# Patient Record
Sex: MALE | Race: WHITE | Employment: FULL TIME | URBAN - METROPOLITAN AREA
[De-identification: names, ages, dates, MRNs, and addresses within clinical notes are randomized per-mention and may not be internally consistent; named-entity substitution may affect disease eponyms.]

---

## 2017-09-12 ENCOUNTER — OFFICE VISIT (OUTPATIENT)
Dept: ORTHOPEDIC SURGERY | Age: 62
End: 2017-09-12

## 2017-09-12 VITALS
SYSTOLIC BLOOD PRESSURE: 138 MMHG | WEIGHT: 200 LBS | HEIGHT: 72 IN | DIASTOLIC BLOOD PRESSURE: 89 MMHG | BODY MASS INDEX: 27.09 KG/M2

## 2017-09-12 DIAGNOSIS — M54.50 PAIN OF LUMBAR SPINE: ICD-10-CM

## 2017-09-12 DIAGNOSIS — M54.16 LUMBAR RADICULOPATHY: ICD-10-CM

## 2017-09-12 DIAGNOSIS — M51.26 LUMBAR DISC HERNIATION: Primary | ICD-10-CM

## 2017-09-12 PROCEDURE — 99203 OFFICE O/P NEW LOW 30 MIN: CPT | Performed by: PHYSICAL MEDICINE & REHABILITATION

## 2017-09-12 PROCEDURE — 72100 X-RAY EXAM L-S SPINE 2/3 VWS: CPT | Performed by: PHYSICAL MEDICINE & REHABILITATION

## 2017-09-12 RX ORDER — ATORVASTATIN CALCIUM 80 MG/1
80 TABLET, FILM COATED ORAL DAILY
COMMUNITY

## 2017-09-12 RX ORDER — METHYLPREDNISOLONE 4 MG/1
TABLET ORAL
Qty: 1 KIT | Refills: 0 | Status: SHIPPED | OUTPATIENT
Start: 2017-09-12 | End: 2017-09-18

## 2017-09-12 RX ORDER — NAPROXEN SODIUM 220 MG
220 TABLET ORAL 2 TIMES DAILY WITH MEALS
COMMUNITY

## 2017-09-12 RX ORDER — CLOPIDOGREL BISULFATE 75 MG/1
75 TABLET ORAL DAILY
COMMUNITY

## 2017-09-13 ENCOUNTER — HOSPITAL ENCOUNTER (OUTPATIENT)
Dept: PHYSICAL THERAPY | Age: 62
Discharge: HOME | End: 2017-09-30
Admitting: PHYSICAL MEDICINE & REHABILITATION

## 2017-09-13 NOTE — PLAN OF CARE
body position  Changing and Maintaining Body Position Current Status ():  At least 1 percent but less than 20 percent impaired, limited or restricted  Changing and Maintaining Body Position Goal Status (): 0 percent impaired, limited or restricted    Pain Scale: 9/10  Easing factors: rest  Provocative factors: sitting prolonged periods of time     Type: []Constant   [x]Intermittent  []Radiating []Localized []other:     Numbness/Tingling: none    Occupation/School: [employer]     Living Status/Prior Level of Function: Independent with ADLs and IADLs,    OBJECTIVE:     ROM  Comments   Lumbar Flex 80 deg    Lumbar Ext 15 deg      ROM LEFT RIGHT Comments   Lumbar Side Bend symmetrical ''    Lumbar Rotation      Hip Flexion      Hip Abd      Hip ER      Hip IR      Hip Extension      Knee Ext      Knee Flex      Hamstring Flex      Piriformis                    Strength LEFT RIGHT Comments   Multifidus      Transverse Ab 3/5     Hip Flexors      Hip Abductors      Hip Extensors                     Myotomes Normal Abnormal Comments   Hip flexion (L1-L2) x     Knee extension (L2-L4) x     Dorsiflexion (L4-L5) x     Great Toe Ext (L5) x     Ankle Eversion (S1-S2) x     Ankle PF(S1-S2) x       Dermatomes Normal Abnormal Comments   inguinal area (L1)  x     anterior mid-thigh (L2) x     distal ant thigh/med knee (L3) x     medial lower leg and foot (L4) x     lateral lower leg and foot (L5) x     posterior calf (S1) x     medial calcaneus (S2) x       Neural dynamic tension testing Normal Abnormal Comments   Slump Test  - Degree of knee flexion:       SLR  x     0-30      30-70      Femoral nerve (L2-4)        Reflexes Normal Abnormal Comments   S1-2 Seated achilles x     S1-2 Prone knee bend x     L3-4 Patellar tendon x     C5-6 Biceps      C6 Brachioradialis      C7-8 Triceps      Clonus      Babinski      Briseno's        Joint mobility: L-spine   []Normal    [x]Hypo   []Hyper    Palpation: TTP over R PSIS, L paraspinals    Functional Mobility/Transfers: antalgic    Posture: flexed posture    Gait: (include devices/WB status) antalgic     Bandages/Dressings/Incisions: NA    Orthopedic Special Tests:    Normal Abnormal N/A Comments   Toe walk         Heel Walk       Fwd Bend-aberrant or innominate mvmt)       Trendelenburg       Kemps/Quadrant       Zekek       ALLY/Ketan       Hip scour       SLR x      Crossed SLR       Supine to sit       Hip thrust       SI distraction/compression       PA/Spring x      Prone Instability test       Prone knee bend       Sacral Spring/thrust                  [x] Patient history, allergies, meds reviewed. Medical chart reviewed. See intake form. Review Of Systems (ROS):  [x]Performed Review of systems (Integumentary, CardioPulmonary, Neurological) by intake and observation. Intake form has been scanned into medical record. Patient has been instructed to contact their primary care physician regarding ROS issues if not already being addressed at this time.       Co-morbidities/Complexities (which will affect course of rehabilitation):   []None           Arthritic conditions   []Rheumatoid arthritis (M05.9)  []Osteoarthritis (M19.91)   Cardiovascular conditions   [x]Hypertension (I10)  []Hyperlipidemia (E78.5)  []Angina pectoris (I20)  []Atherosclerosis (I70)   Musculoskeletal conditions   []Disc pathology   []Congenital spine pathologies   []Prior surgical intervention  []Osteoporosis (M81.8)  []Osteopenia (M85.8)   Endocrine conditions   []Hypothyroid (E03.9)  []Hyperthyroid Gastrointestinal conditions   []Constipation (E96.89)   Metabolic conditions   []Morbid obesity (E66.01)  []Diabetes type 1(E10.65) or 2 (E11.65)   []Neuropathy (G60.9)     Pulmonary conditions   []Asthma (J45)  []Coughing   []COPD (J44.9)   Psychological Disorders  []Anxiety (F41.9)  []Depression (F32.9)   []Other:   []Other:           Barriers to/and or personal factors that will affect rehab potential: bend   [x]Reduced ability to ambulate prolonged functional periods/distances/surfaces   [x]Reduced ability to ascend/descend stairs   []other:       Participation Restrictions   []Reduced participation in self care activities   [x]Reduced participation in home management activities   [x]Reduced participation in work activities   [x]Reduced participation in social activities. []Reduced participation in sport/recreational activities. Classification:   [x]Signs/symptoms consistent with Lumbar instability/stabilization subgroup. [x]Signs/symptoms consistent with Lumbar mobilization/manipulation subgroup, myotomes and dermatomes intact. Meets manipulation criteria. [x]Signs/symptoms consistent with Lumbar direction specific/centralization subgroup   [x]Signs/symptoms consistent with Lumbar traction subgroup     []Signs/symptoms consistent with lumbar facet dysfunction   []Signs/symptoms consistent with lumbar stenosis type dysfunction   [x]Signs/symptoms consistent with nerve root involvement including myotome & dermatome dysfunction   []Signs/symptoms consistent with post-surgical status including: decreased ROM, strength and function.    []signs/symptoms consistent with pathology which may benefit from Dry needling     []other:      Prognosis/Rehab Potential:      []Excellent   [x]Good    []Fair   []Poor    Tolerance of evaluation/treatment:    []Excellent   [x]Good    []Fair   []Poor     Physical Therapy Evaluation Complexity Justification  [] A history of present problem with:  [] no personal factors and/or comorbidities that impact the plan of care;  [x]1-2 personal factors and/or comorbidities that impact the plan of care  []3 personal factors and/or comorbidities that impact the plan of care  [] An examination of body systems using standardized tests and measures addressing any of the following: body structures and functions (impairments), activity limitations, and/or participation restrictions;:  [] a total of 1-2 or more elements   [x] a total of 3 or more elements   [] a total of 4 or more elements   [] A clinical presentation with:  [x] stable and/or uncomplicated characteristics   [] evolving clinical presentation with changing characteristics  [] unstable and unpredictable characteristics;   [] Clinical decision making of [x] low, [] moderate, [] high complexity using standardized patient assessment instrument and/or measurable assessment of functional outcome. [x] EVAL (LOW) 57986 (typically 20 minutes face-to-face)  [] EVAL (MOD) 83156 (typically 30 minutes face-to-face)  [] EVAL (HIGH) 81129 (typically 45 minutes face-to-face)  [] RE-EVAL     PLAN: Begin PT focusing on: proximal hip mobilizations, LB mobs, LB core activation, proximal hip activation, and HEP    Frequency/Duration:  2 days per week for 12 Weeks:  Interventions:  [x]  Therapeutic exercise including: strength training, ROM, for LE, Glutes and core   [x]  NMR activation and proprioception for glutes , LE and Core   [x]  Manual therapy as indicated for Hip complex, LE and spine to include: Dry Needling/IASTM, STM, PROM, Gr I-IV mobilizations, manipulation. [x]  Modalities as needed that may include: thermal agents, E-stim, Biofeedback, US, iontophoresis as indicated  [x]  Patient education on joint protection, postural re-education, activity modification, progression of HEP. HEP instruction: (see scanned forms)    GOALS:  Patient stated goal: Pt would like to return to pain free work activities. Therapist goals for Patient:   Short Term Goals: To be achieved in: 2 weeks  1. Independent in HEP and progression per patient tolerance, in order to prevent re-injury. 2. Patient will have a decrease in pain to facilitate improvement in movement, function, and ADLs as indicated by Functional Deficits. Long Term Goals: To be achieved in: 12 weeks  1.  Disability index score of 0% or less for the SARIAH to assist with reaching prior level of function. 2. Patient will demonstrate increased AROM to WNL, good LS mobility, good hip ROM to allow for proper joint functioning as indicated by patients Functional Deficits. 3. Patient will demonstrate an increase in Strength to good proximal hip and core activation to allow for proper functional mobility as indicated by patients Functional Deficits. 4. Patient will return to all functional activities without increased symptoms or restriction.    5. Pt will demonstrate the ability to perform 10 sit to stands in 30 seconds with 0/10 pain. (patient specific functional goal)       Electronically signed by:  Alverda Spatz, PT,DPT

## 2017-09-13 NOTE — FLOWSHEET NOTE
[facility]opaedics and Sports Rehabilitation, [facility]    Physical [facility] Daily Treatment Note  Date:  2017    Patient Name:  Shyla Soni    :  1955  MRN: [id number]  Restrictions/Precautions:    Medical/Treatment Diagnosis Information:  · Diagnosis: M54.16 (ICD-10-CM) - Lumbar radiculopathy  · Treatment Diagnosis: low back pain with radiculitis   Insurance/Certification information:  PT Insurance Information: CUSTOM DESIGN  Physician Information:  Referring Practitioner: Dr. Edwige Mensah of care signed (Y/N):     Date of Patient follow up with Physician:     G-Code (if applicable):      Date G-Code Applied:    PT G-Codes  Functional Assessment Tool Used: Mod Oswestry   Score: 8%  Functional Limitation: Changing and maintaining body position  Changing and Maintaining Body Position Current Status ():  At least 1 percent but less than 20 percent impaired, limited or restricted  Changing and Maintaining Body Position Goal Status (): 0 percent impaired, limited or restricted    Progress Note: [x]  Yes  []  No  Next due by: Visit #10       Latex Allergy:  [x]NO      []YES  Preferred Language for Healthcare:   [x]English       []other:    Visit # Insurance Allowable   1 CUSTOM     Pain level:  9/10     SUBJECTIVE:  See eval    OBJECTIVE: See eval  Observation:   Test measurements:      RESTRICTIONS/PRECAUTIONS: HTN    Exercises/Interventions:     Therapeutic Ex Sets/sec Reps Notes HEP   Prone props 10'' 10     TA 5'' 10     TA marches 2 20     HS S 90/90 10'' 5     LTR 2 10     bridges 3 10     SLR flex 3 10                                                                    Manual Intervention       Long axis traction hip, L-spine PA, SIJ PA, STM 8'                                         NMR re-education                                                                          Therapeutic Exercise and NMR EXR  [x] (87617) Provided verbal/tactile cueing for activities related to strengthening, flexibility, endurance, ROM for improvements in LE, proximal hip, and core control with self care, mobility, lifting, ambulation.  [] (67430) Provided verbal/tactile cueing for activities related to improving balance, coordination, kinesthetic sense, posture, motor skill, proprioception  to assist with LE, proximal hip, and core control in self care, mobility, lifting, ambulation and eccentric single leg control. NMR and Therapeutic Activities:    [x] (13110 or 40208) Provided verbal/tactile cueing for activities related to improving balance, coordination, kinesthetic sense, posture, motor skill, proprioception and motor activation to allow for proper function of core, proximal hip and LE with self care and ADLs  [] (35086) Gait Re-education- Provided training and instruction to the patient for proper LE, core and proximal hip recruitment and positioning and eccentric body weight control with ambulation re-education including up and down stairs     Home Exercise Program:    [x] (40574) Reviewed/Progressed HEP activities related to strengthening, flexibility, endurance, ROM of core, proximal hip and LE for functional self-care, mobility, lifting and ambulation/stair navigation   [] (55394)Reviewed/Progressed HEP activities related to improving balance, coordination, kinesthetic sense, posture, motor skill, proprioception of core, proximal hip and LE for self care, mobility, lifting, and ambulation/stair navigation      Manual Treatments:  PROM / STM / Oscillations-Mobs:  G-I, II, III, IV (PA's, Inf., Post.)  [x] (91422) Provided manual therapy to mobilize LE, proximal hip and/or LS spine soft tissue/joints for the purpose of modulating pain, promoting relaxation,  increasing ROM, reducing/eliminating soft tissue swelling/inflammation/restriction, improving soft tissue extensibility and allowing for proper ROM for normal function with self care, mobility, lifting and ambulation.      Modalities: other medical complications  [] Other:     Prognosis: [x] Good [] Fair  [] Poor    Patient Requires Follow-up: [x] Yes  [] No    PLAN: See eval  [] Continue per plan of care [] Alter current plan (see comments)  [x] Plan of care initiated [] Hold pending MD visit [] Discharge    Electronically signed by: Luly Adler PT,DPT

## 2017-09-19 ENCOUNTER — HOSPITAL ENCOUNTER (OUTPATIENT)
Dept: PHYSICAL THERAPY | Age: 62
Discharge: HOME | End: 2017-09-19
Admitting: PHYSICAL MEDICINE & REHABILITATION

## 2017-09-26 ENCOUNTER — HOSPITAL ENCOUNTER (OUTPATIENT)
Dept: PHYSICAL THERAPY | Age: 62
Discharge: HOME | End: 2017-09-26
Admitting: PHYSICAL MEDICINE & REHABILITATION

## 2017-10-03 ENCOUNTER — HOSPITAL ENCOUNTER (OUTPATIENT)
Dept: PHYSICAL THERAPY | Age: 62
Discharge: HOME | End: 2017-10-03
Admitting: PHYSICAL MEDICINE & REHABILITATION

## 2017-10-03 NOTE — FLOWSHEET NOTE
[facility]  Orthopaedics and Sports Rehabilitation, [facility]    Physical Therapy Daily Treatment Note  Date:  10/3/2017    Patient Name:  Kyung King   \"Henrique\" :  1955  MRN: [id number]  Restrictions/Precautions:    Medical/Treatment Diagnosis Information:  · Diagnosis: M54.16 (ICD-10-CM) - Lumbar radiculopathy  · Treatment Diagnosis: low back pain with radiculitis   Insurance/Certification information:  PT Insurance Information: CUSTOM DESIGN  Physician Information:  Referring Practitioner: Dr. Navarro Angle of care signed (Y/N):     Date of Patient follow up with Physician:     G-Code (if applicable):      Date G-Code Applied:         Progress Note: [x]  Yes  []  No  Next due by: Visit #10       Latex Allergy:  [x]NO      []YES  Preferred Language for Healthcare:   [x]English       []other:    Visit # Insurance Allowable   4 CUSTOM     Pain level:  0/10     SUBJECTIVE:  Patient reports that that his back is overall feeling better. He reports that he occasionally has pain. OBJECTIVE: See eval  Observation:   Test measurements:      RESTRICTIONS/PRECAUTIONS: HTN    Exercises/Interventions:     Therapeutic Ex Sets/sec Reps Notes HEP       TA marches 2 20     Sahrmann level 1A 2 10     HS S 90/90 10'' 5         Bridges 3 10     SLR flex, abd 3 10     Clam shells 2 10     Hip add isometrics 10\" 10     Quadruped alt UE and LE 2 10     Chops in 1/2 kneel 2 10     SB HS curls + bridge 2 10     SB ball overhead 2 10     Squats NV                    Manual Intervention       Long axis traction hip, L-spine PA, SIJ PA, STM 8'                                         NMR re-education                                                                          Therapeutic Exercise and NMR EXR  [x] (17370) Provided verbal/tactile cueing for activities related to strengthening, flexibility, endurance, ROM for improvements in LE, proximal hip, and core control with self care, mobility, lifting, ambulation.   [] (41117) Provided verbal/tactile cueing for activities related to improving balance, coordination, kinesthetic sense, posture, motor skill, proprioception  to assist with LE, proximal hip, and core control in self care, mobility, lifting, ambulation and eccentric single leg control. NMR and Therapeutic Activities:    [x] (93774 or 89711) Provided verbal/tactile cueing for activities related to improving balance, coordination, kinesthetic sense, posture, motor skill, proprioception and motor activation to allow for proper function of core, proximal hip and LE with self care and ADLs  [] (48511) Gait Re-education- Provided training and instruction to the patient for proper LE, core and proximal hip recruitment and positioning and eccentric body weight control with ambulation re-education including up and down stairs     Home Exercise Program:    [x] (64345) Reviewed/Progressed HEP activities related to strengthening, flexibility, endurance, ROM of core, proximal hip and LE for functional self-care, mobility, lifting and ambulation/stair navigation   [] (92764)Reviewed/Progressed HEP activities related to improving balance, coordination, kinesthetic sense, posture, motor skill, proprioception of core, proximal hip and LE for self care, mobility, lifting, and ambulation/stair navigation      Manual Treatments:  PROM / STM / Oscillations-Mobs:  G-I, II, III, IV (PA's, Inf., Post.)  [x] (44711) Provided manual therapy to mobilize LE, proximal hip and/or LS spine soft tissue/joints for the purpose of modulating pain, promoting relaxation,  increasing ROM, reducing/eliminating soft tissue swelling/inflammation/restriction, improving soft tissue extensibility and allowing for proper ROM for normal function with self care, mobility, lifting and ambulation.      Modalities:  declined    Charges:  Timed Code Treatment Minutes: 35   Total Treatment Minutes: 35      [] EVAL (LOW) 94093 (typically 20 minutes face-to-face)    [] limited by pain  [] Patient limited by other medical complications  [] Other:     Prognosis: [x] Good [] Fair  [] Poor    Patient Requires Follow-up: [x] Yes  [] No    PLAN: See eval  [x] Continue per plan of care [] Alter current plan (see comments)  [] Plan of care initiated [] Hold pending MD visit [] Discharge    Electronically signed by: Ratna Sloan PT,DPT

## 2017-10-10 ENCOUNTER — OFFICE VISIT (OUTPATIENT)
Dept: ORTHOPEDIC SURGERY | Age: 62
End: 2017-10-10

## 2017-10-10 VITALS
HEIGHT: 72 IN | WEIGHT: 200 LBS | DIASTOLIC BLOOD PRESSURE: 77 MMHG | SYSTOLIC BLOOD PRESSURE: 124 MMHG | BODY MASS INDEX: 27.09 KG/M2

## 2017-10-10 DIAGNOSIS — M54.16 LUMBAR RADICULOPATHY: ICD-10-CM

## 2017-10-10 DIAGNOSIS — M47.816 SPONDYLOSIS OF LUMBAR REGION WITHOUT MYELOPATHY OR RADICULOPATHY: Primary | ICD-10-CM

## 2017-10-10 PROCEDURE — 99212 OFFICE O/P EST SF 10 MIN: CPT | Performed by: PHYSICAL MEDICINE & REHABILITATION

## 2017-11-01 ENCOUNTER — HOSPITAL ENCOUNTER (OUTPATIENT)
Dept: OTHER | Age: 62
Discharge: HOME | End: 2017-11-30
Attending: PHYSICAL MEDICINE & REHABILITATION | Admitting: PHYSICAL MEDICINE & REHABILITATION

## 2021-12-10 ENCOUNTER — VIRTUAL VISIT (OUTPATIENT)
Dept: PRIMARY CARE CLINIC | Age: 66
End: 2021-12-10
Payer: MEDICARE

## 2021-12-10 DIAGNOSIS — R05.9 COUGH: ICD-10-CM

## 2021-12-10 DIAGNOSIS — J01.90 ACUTE BACTERIAL SINUSITIS: Primary | ICD-10-CM

## 2021-12-10 DIAGNOSIS — J06.9 UPPER RESPIRATORY TRACT INFECTION, UNSPECIFIED TYPE: ICD-10-CM

## 2021-12-10 DIAGNOSIS — B96.89 ACUTE BACTERIAL SINUSITIS: Primary | ICD-10-CM

## 2021-12-10 PROCEDURE — 1123F ACP DISCUSS/DSCN MKR DOCD: CPT | Performed by: NURSE PRACTITIONER

## 2021-12-10 PROCEDURE — 99203 OFFICE O/P NEW LOW 30 MIN: CPT | Performed by: NURSE PRACTITIONER

## 2021-12-10 PROCEDURE — 4040F PNEUMOC VAC/ADMIN/RCVD: CPT | Performed by: NURSE PRACTITIONER

## 2021-12-10 PROCEDURE — G8484 FLU IMMUNIZE NO ADMIN: HCPCS | Performed by: NURSE PRACTITIONER

## 2021-12-10 PROCEDURE — 4004F PT TOBACCO SCREEN RCVD TLK: CPT | Performed by: NURSE PRACTITIONER

## 2021-12-10 PROCEDURE — G8421 BMI NOT CALCULATED: HCPCS | Performed by: NURSE PRACTITIONER

## 2021-12-10 PROCEDURE — 3017F COLORECTAL CA SCREEN DOC REV: CPT | Performed by: NURSE PRACTITIONER

## 2021-12-10 PROCEDURE — G8427 DOCREV CUR MEDS BY ELIG CLIN: HCPCS | Performed by: NURSE PRACTITIONER

## 2021-12-10 RX ORDER — AMOXICILLIN AND CLAVULANATE POTASSIUM 875; 125 MG/1; MG/1
1 TABLET, FILM COATED ORAL 2 TIMES DAILY
Qty: 20 TABLET | Refills: 0 | Status: SHIPPED | OUTPATIENT
Start: 2021-12-10 | End: 2021-12-20

## 2021-12-10 RX ORDER — BENZONATATE 200 MG/1
200 CAPSULE ORAL 3 TIMES DAILY PRN
Qty: 30 CAPSULE | Refills: 0 | Status: SHIPPED | OUTPATIENT
Start: 2021-12-10 | End: 2021-12-20

## 2021-12-10 RX ORDER — EZETIMIBE 10 MG/1
10 TABLET ORAL DAILY
COMMUNITY
Start: 2021-03-08

## 2021-12-10 ASSESSMENT — ENCOUNTER SYMPTOMS
VOMITING: 0
COUGH: 1
DIARRHEA: 0
WHEEZING: 0
NAUSEA: 0
SORE THROAT: 1
SHORTNESS OF BREATH: 0
CHEST TIGHTNESS: 0
SINUS PRESSURE: 1

## 2021-12-10 NOTE — PROGRESS NOTES
Armida Brand (:  1955) is a 73 y.o. male,New patient, here for evaluation of the following chief complaint(s): Head Congestion (no fever, started last sat), Cough, and Chest Congestion         ASSESSMENT/PLAN:  1. Acute bacterial sinusitis  -     amoxicillin-clavulanate (AUGMENTIN) 875-125 MG per tablet; Take 1 tablet by mouth 2 times daily for 10 days, Disp-20 tablet, R-0Normal  2. Cough  -     benzonatate (TESSALON) 200 MG capsule; Take 1 capsule by mouth 3 times daily as needed for Cough, Disp-30 capsule, R-0Normal  3. Upper respiratory tract infection, unspecified type      Return if symptoms worsen or fail to improve. SUBJECTIVE/OBJECTIVE:  Patient with symptoms for greater than 7 days. He is having sinus pressure, cough, chest congestion, sore throat that comes and goes  He has had 2 covid vaccines. He has not had the flu vaccine. He is not wheezing and not a smoker. Review of Systems   Constitutional: Negative for activity change, appetite change, chills, diaphoresis, fatigue and fever. HENT: Positive for congestion, sinus pressure and sore throat. Respiratory: Positive for cough. Negative for chest tightness, shortness of breath and wheezing. Gastrointestinal: Negative for diarrhea, nausea and vomiting. Neurological: Negative for headaches. No flowsheet data found. Physical Exam  Constitutional:       General: He is not in acute distress. Appearance: Normal appearance. He is normal weight. He is ill-appearing. HENT:      Head: Normocephalic. Right Ear: External ear normal.      Left Ear: External ear normal.      Nose: Nose normal.      Mouth/Throat:      Mouth: Mucous membranes are moist.   Eyes:      Extraocular Movements: Extraocular movements intact. Conjunctiva/sclera: Conjunctivae normal.      Pupils: Pupils are equal, round, and reactive to light.    Pulmonary:      Effort: Pulmonary effort is normal.   Abdominal:      General: Abdomen is flat.   Musculoskeletal:         General: Normal range of motion. Cervical back: Normal range of motion. Neurological:      General: No focal deficit present. Mental Status: He is alert and oriented to person, place, and time. Ethan Snider was evaluated through a synchronous (real-time) audio-video encounter. The patient (or guardian if applicable) is aware that this is a billable service. Verbal consent to proceed has been obtained within the past 12 months. The visit was conducted pursuant to the emergency declaration under the [address] [place] [address], [address] [place] [address] authority and the [employer] General Act. Patient identification was verified, and a caregiver was present when appropriate. The patient was located in a state where the provider was credentialed to provide care. An electronic signature was used to authenticate this note.     --NICHELLE Hurley - CNP

## 2022-01-31 ENCOUNTER — OFFICE VISIT (OUTPATIENT)
Dept: PRIMARY CARE CLINIC | Age: 67
End: 2022-01-31
Payer: MEDICARE

## 2022-01-31 DIAGNOSIS — J98.8 CONGESTION OF RESPIRATORY TRACT: Primary | ICD-10-CM

## 2022-01-31 DIAGNOSIS — R05.9 COUGH: ICD-10-CM

## 2022-01-31 DIAGNOSIS — J02.9 PHARYNGITIS, UNSPECIFIED ETIOLOGY: ICD-10-CM

## 2022-01-31 PROCEDURE — G8428 CUR MEDS NOT DOCUMENT: HCPCS | Performed by: NURSE PRACTITIONER

## 2022-01-31 PROCEDURE — 99213 OFFICE O/P EST LOW 20 MIN: CPT | Performed by: NURSE PRACTITIONER

## 2022-01-31 PROCEDURE — 4004F PT TOBACCO SCREEN RCVD TLK: CPT | Performed by: NURSE PRACTITIONER

## 2022-01-31 PROCEDURE — 1123F ACP DISCUSS/DSCN MKR DOCD: CPT | Performed by: NURSE PRACTITIONER

## 2022-01-31 PROCEDURE — G8421 BMI NOT CALCULATED: HCPCS | Performed by: NURSE PRACTITIONER

## 2022-01-31 PROCEDURE — 3017F COLORECTAL CA SCREEN DOC REV: CPT | Performed by: NURSE PRACTITIONER

## 2022-01-31 PROCEDURE — 4040F PNEUMOC VAC/ADMIN/RCVD: CPT | Performed by: NURSE PRACTITIONER

## 2022-01-31 PROCEDURE — G8484 FLU IMMUNIZE NO ADMIN: HCPCS | Performed by: NURSE PRACTITIONER

## 2022-01-31 RX ORDER — METHYLPREDNISOLONE 4 MG/1
TABLET ORAL
Qty: 1 KIT | Refills: 0 | Status: SHIPPED | OUTPATIENT
Start: 2022-01-31

## 2022-01-31 RX ORDER — BENZONATATE 200 MG/1
200 CAPSULE ORAL 3 TIMES DAILY PRN
Qty: 30 CAPSULE | Refills: 0 | Status: SHIPPED | OUTPATIENT
Start: 2022-01-31 | End: 2022-02-10

## 2022-01-31 ASSESSMENT — ENCOUNTER SYMPTOMS
HEMOPTYSIS: 0
WHEEZING: 0
RHINORRHEA: 0
DIARRHEA: 0
SORE THROAT: 1
COUGH: 1
HEARTBURN: 0
VOMITING: 0
CHEST TIGHTNESS: 0
NAUSEA: 0
SHORTNESS OF BREATH: 0

## 2022-01-31 NOTE — PROGRESS NOTES
Kody Vaughn (:  1955) is a 73 y.o. male,Established patient, here for evaluation of the following chief complaint(s):  Illness         ASSESSMENT/PLAN:  1. Congestion of respiratory tract  -     COVID-19  -     methylPREDNISolone (MEDROL DOSEPACK) 4 MG tablet; Take by mouth., Disp-1 kit, R-0Normal  -     benzonatate (TESSALON) 200 MG capsule; Take 1 capsule by mouth 3 times daily as needed for Cough, Disp-30 capsule, R-0Normal  2. Pharyngitis, unspecified etiology  -     COVID-19  -     methylPREDNISolone (MEDROL DOSEPACK) 4 MG tablet; Take by mouth., Disp-1 kit, R-0Normal  3. Cough  -     COVID-19  -     methylPREDNISolone (MEDROL DOSEPACK) 4 MG tablet; Take by mouth., Disp-1 kit, R-0Normal  -     benzonatate (TESSALON) 200 MG capsule; Take 1 capsule by mouth 3 times daily as needed for Cough, Disp-30 capsule, R-0Normal      Return if symptoms worsen or fail to improve. Subjective   SUBJECTIVE/OBJECTIVE:  Wife tested today for covid, she works in the school setting  He has a cough, congestion and sore throat  He had a URI a couple months ago, feels the same  He stated he has had covid vaccines    Cough  The current episode started yesterday. The problem has been unchanged. The problem occurs every few minutes. The cough is non-productive. Associated symptoms include nasal congestion and a sore throat. Pertinent negatives include no chest pain, chills, ear congestion, ear pain, fever, headaches, heartburn, hemoptysis, myalgias, postnasal drip, rash, rhinorrhea, shortness of breath, sweats, weight loss or wheezing. Review of Systems   Constitutional: Negative for activity change, appetite change, chills, diaphoresis, fatigue, fever and weight loss. HENT: Positive for congestion and sore throat. Negative for ear pain, postnasal drip and rhinorrhea. Respiratory: Positive for cough. Negative for hemoptysis, chest tightness, shortness of breath and wheezing.     Cardiovascular: Negative for chest pain. Gastrointestinal: Negative for diarrhea, heartburn, nausea and vomiting. Musculoskeletal: Negative for myalgias. Skin: Negative for rash. Neurological: Negative for headaches. Objective   Physical Exam  HENT:      Head: Normocephalic. Right Ear: External ear normal.      Left Ear: External ear normal.      Nose: Congestion present. Mouth/Throat:      Mouth: Mucous membranes are moist.   Eyes:      Extraocular Movements: Extraocular movements intact. Conjunctiva/sclera: Conjunctivae normal.      Pupils: Pupils are equal, round, and reactive to light. Pulmonary:      Effort: Pulmonary effort is normal.   Musculoskeletal:         General: Normal range of motion. Cervical back: Normal range of motion. Skin:     General: Skin is warm. Neurological:      General: No focal deficit present. Mental Status: He is alert and oriented to person, place, and time. An electronic signature was used to authenticate this note.     --NICHELLE Ellsworth - CNP

## 2022-02-01 LAB — SARS-COV-2: NOT DETECTED

## 2022-04-25 ENCOUNTER — HOSPITAL ENCOUNTER (OUTPATIENT)
Dept: PHYSICAL THERAPY | Age: 67
Setting detail: THERAPIES SERIES
Discharge: HOME | End: 2022-04-25
Payer: COMMERCIAL

## 2022-04-25 PROCEDURE — 97161 PT EVAL LOW COMPLEX 20 MIN: CPT

## 2022-04-25 PROCEDURE — 97110 THERAPEUTIC EXERCISES: CPT

## 2022-04-25 PROCEDURE — 97140 MANUAL THERAPY 1/> REGIONS: CPT

## 2022-04-25 NOTE — PLAN OF CARE
[facility] [facility], [facility]5 [facility], [address] [facility] [address]  Phone: [phone]   Fax:     [phone]       Physical Therapy Certification    Dear: Angel Bourne,    We had the pleasure of evaluating the following patient for physical therapy services at [address] [place] [address]. A summary of our findings can be found in the initial assessment below. This includes our plan of care. If you have any questions or concerns regarding these findings, please do not hesitate to contact me at the office phone number checked above. Thank you for the referral.       Physician Signature:_______________________________Date:__________________  By signing above (or electronic signature), therapists plan is approved by physician      Patient: Jovanny Cheung   : 1955   MRN: [id number]  Referring Physician: : Angel Bourne      Evaluation Date: 2022      Medical Diagnosis Information:  Diagnosis: Left hip fracture Z87.81   Treatment Diagnosis: Left hip pain, decreased ROM, functional mobility                                        Insurance information: PT Insurance Information: Workers comp, 12 visit thru      Precautions/ Contra-indications: is 50% WB    Latex Allergy:  [x]NO      []YES    Preferred Language for Healthcare:   [x]English       []other:    SUBJECTIVE: 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 Had surgery on 22 to having ORIF pinning of left hip    Relevant Medical History: None    Pain Scale: Current: 0/10; Max 5/10; Best 0/10    Easing factors: Rest    Provocative factors: Too much movement     Type: []Constant   [x]Intermittent  []Radiating []Localized []other:     Numbness/Tingling: None    Occupation/School: Warehouse, a lot of walking and pulling.      Living Status/Prior Level of Function: Independent with ADLs and IADLs. C-SSRS Triggered by Intake questionnaire (Past 2 wk assessment):   [x] No, Questionnaire did not trigger screening.   [] Yes, Patient intake triggered further evaluation      [] C-SSRS Screening completed  [] PCP notified via Plan of Care  [] Emergency services notified     OBJECTIVE:     ROM RIGHT LEFT   Hip Flexion  95   Hip Abduction  30   Hip Extension     Hip Internal Rotation     Hip External Rotation          Knee Extension  0   Knee Flexion  65        Ankle Dorsiflexion     Ankle Plantarflexion     Ankle Inversion     Ankle Eversion          Popliteal angle     Rectus femoris          Strength  RIGHT LEFT   Hip Flexors  4-   Hip Abductors     Hip Extension     Hip External Rotation     Hip Internal Rotation          Knee Extension     Knee Flexion          Ankle Dorsiflexion     Ankle Plantarflexion     Ankle Inversion     Ankle Eversion            Reflexes/Sensation:    [x]Dermatomes/Myotomes intact    []Reflexes equal and normal bilaterally - NT   []Other:    Joint mobility:    []Normal    [x]Hypo   []Hyper    Palpation: no ttp    Functional Mobility/Transfers: Mod I    Posture: WFL    Bandages/Dressings/Incisions: NT    Gait: (include devices/WB status) Using FWW at 50% WB    Orthopedic Special Tests: None                       [x] Patient history, allergies, meds reviewed. Medical chart reviewed. See intake form. Review Of Systems (ROS):  [x]Performed Review of systems (Integumentary, CardioPulmonary, Neurological) by intake and observation. Intake form has been scanned into medical record. Patient has been instructed to contact their primary care physician regarding ROS issues if not already being addressed at this time.       Co-morbidities/Complexities (which will affect course of rehabilitation):   [x]None           Arthritic conditions   []Rheumatoid arthritis (M05.9)  []Osteoarthritis (M19.91)   Cardiovascular conditions   []Hypertension (I10)  []Hyperlipidemia (E78.5)  []Angina pectoris (I20)  []Atherosclerosis (I70)   Musculoskeletal conditions   []Disc pathology   []Congenital spine pathologies   []Prior surgical intervention  []Osteoporosis (M81.8)  []Osteopenia (M85.8)   Endocrine conditions   []Hypothyroid (E03.9)  []Hyperthyroid Gastrointestinal conditions   []Constipation (J04.48)   Metabolic conditions   []Morbid obesity (E66.01)  []Diabetes type 1(E10.65) or 2 (E11.65)   []Neuropathy (G60.9)     Pulmonary conditions   []Asthma (J45)  []Coughing   []COPD (J44.9)   Psychological Disorders  []Anxiety (F41.9)  []Depression (F32.9)   []Other:   []Other:          Barriers to/and or personal factors that will affect rehab potential:              []Age  []Sex              []Motivation/Lack of Motivation                        []Co-Morbidities              []Cognitive Function, education/learning barriers              []Environmental, home barriers              []profession/work barriers  []past PT/medical experience  []other:  Justification: None    Falls Risk Assessment (30 days):   [] Falls Risk assessed and no intervention required. [x] Falls Risk assessed and Patient requires intervention due to being higher risk   TUG score (>12s at risk):     [x] Falls education provided, including           Functional Questionnaire: FOTO 37        ASSESSMENT: Jw Jeronimo is a 73 y.o. male reporting to OP PT s/p left hip ORIF on 4/1/22 which occurred after fall at work. Pt is noted to have reduced hip and knee ROM. Moderate reduction in hip flexion strength. Still ambulating with FWW at 50% WB.          Functional Impairments:     [x]Noted lumbar/proximal hip/LE joint hypomobility   [x]Decreased LE functional ROM   [x]Decreased core/proximal hip strength and neuromuscular control   [x]Decreased LE functional strength   [x]Reduced balance/proprioceptive control   []other:      Functional Activity Limitations (from functional questionnaire and intake)   []Reduced ability to tolerate prolonged functional positions   [x]Reduced ability or difficulty with changes of positions or transfers between positions   []Reduced ability to maintain good posture and demonstrate good body mechanics with sitting, bending, and lifting   []Reduced ability to sleep   [] Reduced ability or tolerance with driving and/or computer work   []Reduced ability to perform lifting, carrying tasks   [x]Reduced ability to squat   []Reduced ability to forward bend   [x]Reduced ability to ambulate prolonged functional periods/distances/surfaces   [x]Reduced ability to ascend/descend stairs   []Reduced ability to run, hop, cut or jump   []other:    Participation Restrictions   [x]Reduced participation in self care activities   [x]Reduced participation in home management activities   [x]Reduced participation in work activities   [x]Reduced participation in social activities. []Reduced participation in sport/recreation activities. Classification :    [x]Signs/symptoms consistent with post-surgical status including decreased ROM, strength and function.    []Signs/symptoms consistent with joint sprain/strain   []Signs/symptoms consistent with patella-femoral syndrome   []Signs/symptoms consistent with knee OA/hip OA   []Signs/symptoms consistent with internal derangement of knee/Hip   []Signs/symptoms consistent with functional hip weakness/NMR control      []Signs/symptoms consistent with tendinitis/tendinosis    []signs/symptoms consistent with pathology which may benefit from Dry needling      []other:      Prognosis/Rehab Potential:      []Excellent   [x]Good    []Fair   []Poor    Tolerance of evaluation/treatment:    []Excellent   [x]Good    []Fair   []Poor    Physical Therapy Evaluation Complexity Justification  [x] A history of present problem with:  [x] no personal factors and/or comorbidities that impact the plan of care;  []1-2 personal factors and/or comorbidities that impact the plan of care  []3 personal factors and/or comorbidities that impact the plan of care  [x] An examination of body systems using standardized tests and measures addressing any of the following: body structures and functions (impairments), activity limitations, and/or participation restrictions;:  [x] a total of 1-2 or more elements   [] a total of 3 or more elements   [] a total of 4 or more elements   [x] A clinical presentation with:  [x] stable and/or uncomplicated characteristics   [] evolving clinical presentation with changing characteristics  [] unstable and unpredictable characteristics;   [x] Clinical decision making of [] low, [] moderate, [] high complexity using standardized patient assessment instrument and/or measurable assessment of functional outcome. [x] EVAL (LOW) 12182 (typically 20 minutes face-to-face)  [] EVAL (MOD) 33352 (typically 30 minutes face-to-face)  [] EVAL (HIGH) 44268 (typically 45 minutes face-to-face)  [] RE-EVAL     PLAN:   Frequency/Duration:  2 days per week for 8 Weeks:  Interventions:  [x]  Therapeutic exercise including: strength training, ROM, for Lower extremity and core   [x]  NMR activation and proprioception for LE, Glutes and Core   [x]  Manual therapy as indicated for LE, Hip and spine to include: Dry Needling/IASTM, STM, PROM, Gr I-IV mobilizations, manipulation. [x] Modalities as needed that may include: thermal agents, E-stim, Biofeedback, US, iontophoresis as indicated  [x] Patient education on joint protection, postural re-education, activity modification, progression of HEP. HEP instruction: [id number]      GOALS:  Patient stated goal: Improve mobility, return to work. Therapist goals for Patient:   Short Term Goals: To be achieved in: 2 weeks  1. Independent in HEP and progression per patient tolerance, in order to prevent re-injury. [] Progressing: [] Met: [] Not Met: [] Adjusted     2.  Patient will have a decrease in pain of NRPS to <2/10 facilitate improvement in movement, function, and ADLs as indicated by Functional Deficits. [] Progressing: [] Met: [] Not Met: [] Adjusted     Long Term Goals: To be achieved in: 8 weeks  1. Pt will improve FOTO to 60 or more, indicating improved functional capacity. [] Progressing: [] Met: [] Not Met: [] Adjusted      2. Patient will demonstrate increased AROM to hip flex/knee flexion to 110/115 to allow for proper joint functioning as indicated by patients Functional Deficits. [] Progressing: [] Met: [] Not Met: [] Adjusted     3. Patient will demonstrate an increase in Strength to hip flexion  to 5/5 allow for proper functional mobility as indicated by patients Functional Deficits. [] Progressing: [] Met: [] Not Met: [] Adjusted     4. Patient will return to functional activities with NRPS of 0/10. [] Progressing: [] Met: [] Not Met: [] Adjusted     5.  Pt will be able to ambulate stairs reciprocally (patient specific functional goal)    [] Progressing: [] Met: [] Not Met: [] Adjusted      Electronically signed by:  Brenton Hodgkin, PT

## 2022-04-25 NOTE — FLOWSHEET NOTE
[address] [place] [address] and Sports Rehabilitation[address], [address] [place] [address] [address]  Phone: [phone]   Fax:     [phone]      Physical Therapy Treatment Note/ Progress Report:           Date:  2022    Patient Name:  Jeanna Guardado    :  1955  MRN: [id number]  Restrictions/Precautions:    Medical/Treatment Diagnosis Information:  · Diagnosis: Left hip fracture Z87.81  · Treatment Diagnosis: Left hip pain, decreased ROm and strength  Insurance/Certification information:  PT Insurance Information: Workers comp, 12 visit thru   Physician Information[de-identified] Annamaria Martinez  Has the plan of care been signed (Y/N):        []  Yes  []  No     Date of Patient follow up with Physician:     Assessment Summary: Radha Juan is a 73 y.o. male reporting to OP PT s/p left hip ORIF on 22 which occurred after fall at work. Pt is noted to have reduced hip and knee ROM. Moderate reduction in hip flexion strength. Still ambulating with FWW at 50% WB.        Is this a Progress Report:     []  Yes  [x]  No        If Yes:  Date Range for reporting period:  Beginning   22  Ending 22    Progress report will be due (10 Rx or 30 days whichever is less):        Recertification will be due (POC Duration  / 90 days whichever is less): 22          Visit # Insurance Allowable Auth Required   In Person  12 thru  []  Yes     []  No    [facility]   []  Yes     []  No    Total 1             Functional Scale: FOTO 37    Date assessed:       Latex Allergy:  [x]NO      []YES  Preferred Language for Healthcare:   [x]English       []other:      Pain level:  0-5/10     SUBJECTIVE:  see eval    OBJECTIVE: see eval      RESTRICTIONS/PRECAUTIONS: 50% WB    Exercises/Interventions: HEP code: [id number]  Therapeutic Ex (45214) HEP 22     Warm-up       Rec bike              TABLE       Heel slides x x30     Adduction isometric x x30     HL CS x X30, BB     Pelvic tilts x x20     Bridge x x20     Wall slides x x20                   SEATED                                                 STANDING                                                                                                                  Manual (65854): PROm to hip flexion, abduction and knee flexion 8'    Therapeutic Exercise and NMR EXR  [x] (26398) Provided verbal/tactile cueing for activities related to strengthening, flexibility, endurance, ROM for improvements in LE, proximal hip, and core control with self care, mobility, lifting, ambulation. [x] (55417) Provided verbal/tactile cueing for activities related to improving balance, coordination, kinesthetic sense, posture, motor skill, proprioception to assist with LE, proximal hip, and core control in self-care, mobility, lifting, ambulation and eccentric single leg control.      NMR and Therapeutic Activities:    [x] (95679 or 49951) Provided verbal/tactile cueing for activities related to improving balance, coordination, kinesthetic sense, posture, motor skill, proprioception and motor activation to allow for proper function of core, proximal hip and LE with self-care and ADLs and functional mobility.   [] (75117) Gait Re-education- Provided training and instruction to the patient for proper LE, core and proximal hip recruitment and positioning and eccentric body weight control with ambulation re-education including up and down stairs     Home Exercise Program:    [x] (77948) Reviewed/Progressed HEP activities related to strengthening, flexibility, endurance, ROM of core, proximal hip and LE for functional self-care, mobility, lifting and ambulation/stair navigation   [] (98605) Reviewed/Progressed HEP activities related to improving balance, coordination, kinesthetic sense, posture, motor skill, proprioception of core, proximal hip and LE for self-care, mobility, lifting, and ambulation/stair navigation      Manual Treatments:  PROM / STM / Oscillations-Mobs:  G-I, II, III, IV (PA's, Inf., Post.)  [x] (33527) Provided manual therapy to mobilize LE, proximal hip and/or LS spine soft tissue/joints for the purpose of modulating pain, promoting relaxation, increasing ROM, reducing/eliminating soft tissue swelling/inflammation/restriction, improving soft tissue extensibility and allowing for proper ROM for normal function with self-care, mobility, lifting and ambulation. Modalities:     [] GAME READY (VASO)- for significant edema, swelling, pain control. Charges:  Timed Code Treatment Minutes: 25   Total Treatment Minutes:  45   BWC:  TE TIME:  NMR TIME:  MANUAL TIME:   TA  UNTIMED MINUTES:  Medicare Total:   17    8    20        [x] EVAL (LOW) 34699 (typically 20 minutes face-to-face)  [] EVAL (MOD) 39997 (typically 30 minutes face-to-face)  [] EVAL (HIGH) 03651 (typically 45 minutes face-to-face)  [] RE-EVAL     [x] RU(60876) 1     [] IONTO  [] NMR (42084) x     [] VASO  [x] Manual (60812) 1     [] Dry Needle:  [] TA x      [] Mech Traction (77010)  [] ES(attended) (47540)      [] ES (un) (70452):        GOALS:     Patient stated goal: Improve mobility, return to work.      Therapist goals for Patient:   Short Term Goals: To be achieved in: 2 weeks  1. Independent in HEP and progression per patient tolerance, in order to prevent re-injury. []? Progressing: []? Met: []? Not Met: []? Adjusted      2. Patient will have a decrease in pain of NRPS to <2/10 facilitate improvement in movement, function, and ADLs as indicated by Functional Deficits. []? Progressing: []? Met: []? Not Met: []? Adjusted      Long Term Goals: To be achieved in: 8 weeks  1. Pt will improve FOTO to 60 or more, indicating improved functional capacity. []? Progressing: []? Met: []? Not Met: []? Adjusted       2. Patient will demonstrate increased AROM to hip flex/knee flexion to 110/115 to allow for proper joint functioning as indicated by patients Functional Deficits. []? Progressing: []? Met: []? Not Met: []? Adjusted      3. Patient will demonstrate an increase in Strength to hip flexion  to 5/5 allow for proper functional mobility as indicated by patients Functional Deficits. []? Progressing: []? Met: []? Not Met: []? Adjusted      4. Patient will return to functional activities with NRPS of 0/10. []? Progressing: []? Met: []? Not Met: []? Adjusted      5. Pt will be able to ambulate stairs reciprocally (patient specific functional goal)    []? Progressing: []? Met: []? Not Met: []? Adjusted                ASSESSMENT:  See eval    Patient received education on their current pathology and how their condition effects them with their functional activities. Patient understood discussion and questions were answered. Patient understands their activity limitations and understands rational for treatment progression. Pt educated on plan of care and HEP, if worsening symptoms to d/c that exercise. Return to Play: (if applicable)   []  Stage 1: Intro to Strength   []  Stage 2: Return to Run and Strength   []  Stage 3: Return to Jump and Strength   []  Stage 4: Dynamic Strength and Agility   []  Stage 5: Sport Specific Training     []  Ready to Return to Play, Meets All Above Stages   []  Not Ready for Return to Sports   Comments:                               PLAN: See eval  [x] Continue per plan of care [] Alter current plan (see comments above)  [] Plan of care initiated [] Hold pending MD visit [] Discharge      Electronically signed by:  Delmar Otero, PT    Note: If patient does not return for scheduled/ recommended follow up visits, this note will serve as a discharge from care along with most recent update on progress.

## 2022-04-25 NOTE — PROGRESS NOTES
[facility] [facility], [address], [address] [place] [address] [address]  Phone: [phone]   Fax: [phone]    Date: 2022          Patient Name; :  Casimiro Gustafson; 1955   Dx: Diagnosis: Left hip fracture Z87.81      Physician[de-identified] Balaji Rutherford        Total PT Visits:      Measures Previous Current   Pain (0-10)     Disability %           Assessment:        Prognosis for POC: [] Good [] Fair  [] Poor      Patient requires continued skilled intervention: [] Yes  [] No        Plan & Recommendations:  [] Continue rehabilitation due to objective improvement and continued functional deficits with frequency and duration:   [] Progress toward  []GAP, []Work Conditioning, []Independent HEP   [] Discharge due to   [] All goals achieved, [] Maximized \"medical necessity\" [] No subjective or objective improvements      Electronically signed by:  Dio Monte, PT  Therapy Plan of Care Re-Certification  This patient has been re-evaluated for physical therapy services and for therapy to continue, Medicare, Medicaid and other insurances require periodic physician review of the treatment plan. Please review the above re-evaluation and verify that you agree with plan of care as established above by signing the attached document and return it to our office or note changes to established plan below  [] Follow treatment plan as above [] Discontinue physical therapy  [] Change plan to:                                 __________________________________________________    Physician Signature:____________________________________ Date:____________  By signing above, therapists plan is approved by physician    If you have any questions or concerns, please don't hesitate to call.   Thank you for your referral.

## 2022-05-03 ENCOUNTER — HOSPITAL ENCOUNTER (OUTPATIENT)
Dept: PHYSICAL THERAPY | Age: 67
Setting detail: THERAPIES SERIES
Discharge: HOME | End: 2022-05-03
Payer: COMMERCIAL

## 2022-05-03 PROCEDURE — 97112 NEUROMUSCULAR REEDUCATION: CPT

## 2022-05-03 PROCEDURE — 97110 THERAPEUTIC EXERCISES: CPT

## 2022-05-03 PROCEDURE — 97140 MANUAL THERAPY 1/> REGIONS: CPT

## 2022-05-03 NOTE — FLOWSHEET NOTE
[address] [facility], [address] [place] [address] [address]  Phone: [phone]   Fax:     [phone]      Physical Therapy Treatment Note/ Progress Report:           Date:  5/3/2022    Patient Name:  Jackelyn Barclay    :  1955  MRN: [id number]  Restrictions/Precautions:    Medical/Treatment Diagnosis Information:  · Diagnosis: Left hip fracture Z87.81  · Treatment Diagnosis: Left hip pain, decreased ROm and strength  Insurance/Certification information:  PT Insurance Information: Workers comp, 12 visit thru   Physician Information[de-identified] Candido Sotelo  Has the plan of care been signed (Y/N):        []  Yes  []  No     Date of Patient follow up with Physician:     Assessment Summary: [facility] is a 73 y.o. male reporting to OP PT s/p left hip ORIF on 22 which occurred after fall at work. Pt is noted to have reduced hip and knee ROM. Moderate reduction in hip flexion strength. Still ambulating with FWW at 50% WB. Is this a Progress Report:     []  Yes  [x]  No        If Yes:  Date Range for reporting period:  Beginning   22  Ending 22    Progress report will be due (10 Rx or 30 days whichever is less):        Recertification will be due (POC Duration  / 90 days whichever is less): 22          Visit # Insurance Allowable Auth Required   In Person  12 thru  []  Yes     []  No    [facility]   []  Yes     []  No    Total 2             Functional Scale: FOTO 37    Date assessed:       Latex Allergy:  [x]NO      []YES  Preferred Language for Healthcare:   [x]English       []other:      Pain level:  0/10     SUBJECTIVE:  Pt states feels pretty good. Feels knee is bending some better.        OBJECTIVE:       RESTRICTIONS/PRECAUTIONS: 50% WB    Exercises/Interventions: HEP code: [id number]  Therapeutic Ex (17229) HEP 4/25/22 5/3/22    Warm-up       Rec bike   8'           TABLE       Heel slides x x30     Adduction isometric x x30 x30    SL CS x X30, BB X30, GTB    Pelvic tilts x x20 x30    Bridge x x20 x30    Wall slides x x20     Prone hip extension   x20 B    Prone knee flexion   x20                                       STANDING       Wedge gastroc stretch   1'    Heel raises on 1/2 roll   x30    Toe raises on 1/2 roll   x30    Leg Press DL   X30, 80#    HS curl machine   X30, 35#    Knee extension machine   X30, 30#                                                                     Manual (74978): PROm to hip flexion, abduction, IR/ER and knee flexion, prone quad stretch 13'    Therapeutic Exercise and NMR EXR  [x] (14960) Provided verbal/tactile cueing for activities related to strengthening, flexibility, endurance, ROM for improvements in LE, proximal hip, and core control with self care, mobility, lifting, ambulation. [x] (09145) Provided verbal/tactile cueing for activities related to improving balance, coordination, kinesthetic sense, posture, motor skill, proprioception to assist with LE, proximal hip, and core control in self-care, mobility, lifting, ambulation and eccentric single leg control.      NMR and Therapeutic Activities:    [x] (22314 or 18300) Provided verbal/tactile cueing for activities related to improving balance, coordination, kinesthetic sense, posture, motor skill, proprioception and motor activation to allow for proper function of core, proximal hip and LE with self-care and ADLs and functional mobility.   [] (26299) Gait Re-education- Provided training and instruction to the patient for proper LE, core and proximal hip recruitment and positioning and eccentric body weight control with ambulation re-education including up and down stairs     Home Exercise Program:    [x] (89667) Reviewed/Progressed HEP activities related to strengthening, flexibility, endurance, ROM of core, proximal hip and LE for functional self-care, mobility, lifting and ambulation/stair navigation   [] (77521) Reviewed/Progressed HEP activities related to improving balance, coordination, kinesthetic sense, posture, motor skill, proprioception of core, proximal hip and LE for self-care, mobility, lifting, and ambulation/stair navigation      Manual Treatments:  PROM / STM / Oscillations-Mobs:  G-I, II, III, IV (PA's, Inf., Post.)  [x] (24158) Provided manual therapy to mobilize LE, proximal hip and/or LS spine soft tissue/joints for the purpose of modulating pain, promoting relaxation, increasing ROM, reducing/eliminating soft tissue swelling/inflammation/restriction, improving soft tissue extensibility and allowing for proper ROM for normal function with self-care, mobility, lifting and ambulation. Modalities:     [] GAME READY (VASO)- for significant edema, swelling, pain control. Charges:  Timed Code Treatment Minutes: 53   Total Treatment Minutes:  53   BWC:  TE TIME:  NMR TIME:  MANUAL TIME:   TA  UNTIMED MINUTES:  Medicare Total:   30  10  13            [] EVAL (LOW) 46947 (typically 20 minutes face-to-face)  [] EVAL (MOD) 56563 (typically 30 minutes face-to-face)  [] EVAL (HIGH) 99602 (typically 45 minutes face-to-face)  [] RE-EVAL     [x] ES(21016) 2     [] IONTO  [x] NMR (62101) x     [] VASO  [x] Manual (07630) 1     [] Dry Needle:  [] TA x      [] Mech Traction (03371)  [] ES(attended) (86017)      [] ES (un) (64892):        GOALS:     Patient stated goal: Improve mobility, return to work.      Therapist goals for Patient:   Short Term Goals: To be achieved in: 2 weeks  1. Independent in HEP and progression per patient tolerance, in order to prevent re-injury. []? Progressing: []? Met: []? Not Met: []? Adjusted      2. Patient will have a decrease in pain of NRPS to <2/10 facilitate improvement in movement, function, and ADLs as indicated by Functional Deficits. []? Progressing: []? Met: []? Not Met: []? Adjusted      Long Term Goals: To be achieved in: 8 weeks  1.  Pt will improve FOTO to 60 or more, indicating improved functional capacity. []? Progressing: []? Met: []? Not Met: []? Adjusted       2. Patient will demonstrate increased AROM to hip flex/knee flexion to 110/115 to allow for proper joint functioning as indicated by patients Functional Deficits. []? Progressing: []? Met: []? Not Met: []? Adjusted      3. Patient will demonstrate an increase in Strength to hip flexion  to 5/5 allow for proper functional mobility as indicated by patients Functional Deficits. []? Progressing: []? Met: []? Not Met: []? Adjusted      4. Patient will return to functional activities with NRPS of 0/10. []? Progressing: []? Met: []? Not Met: []? Adjusted      5. Pt will be able to ambulate stairs reciprocally (patient specific functional goal)    []? Progressing: []? Met: []? Not Met: []? Adjusted                ASSESSMENT:  Pt ventura session well. Knee remains stiff but ROm improved from previous visit. Unable to make full revolution with bike. Hip stiff into abduction. Appears compliant with HEP. Patient received education on their current pathology and how their condition effects them with their functional activities. Patient understood discussion and questions were answered. Patient understands their activity limitations and understands rational for treatment progression. Pt educated on plan of care and HEP, if worsening symptoms to d/c that exercise. PLAN: See eval  [x] Continue per plan of care [] Alter current plan (see comments above)  [] Plan of care initiated [] Hold pending MD visit [] Discharge      Electronically signed by:  Td Thompson, PT    Note: If patient does not return for scheduled/ recommended follow up visits, this note will serve as a discharge from care along with most recent update on progress.

## 2022-05-06 ENCOUNTER — HOSPITAL ENCOUNTER (OUTPATIENT)
Dept: PHYSICAL THERAPY | Age: 67
Setting detail: THERAPIES SERIES
Discharge: HOME | End: 2022-05-06
Payer: COMMERCIAL

## 2022-05-06 PROCEDURE — 97110 THERAPEUTIC EXERCISES: CPT

## 2022-05-06 PROCEDURE — 97112 NEUROMUSCULAR REEDUCATION: CPT

## 2022-05-06 PROCEDURE — 97140 MANUAL THERAPY 1/> REGIONS: CPT

## 2022-05-06 NOTE — FLOWSHEET NOTE
3 [facility], [address] [place] [address] [address]  Phone: [phone]   Fax:     [phone]      Physical Therapy Treatment Note/ Progress Report:           Date:  2022    Patient Name:  Rachel Hennessy    :  1955  MRN: [id number]  Restrictions/Precautions:    Medical/Treatment Diagnosis Information:  · Diagnosis: Left hip fracture Z87.81  · Treatment Diagnosis: Left hip pain, decreased ROm and strength  Insurance/Certification information:  PT Insurance Information: Workers comp, 12 visit thru   Physician Information[de-identified] Ajith Delgado  Has the plan of care been signed (Y/N):        []  Yes  []  No     Date of Patient follow up with Physician:     Assessment Summary: Lona Martin is a 73 y.o. male reporting to OP PT s/p left hip ORIF on 22 which occurred after fall at work. Pt is noted to have reduced hip and knee ROM. Moderate reduction in hip flexion strength. Still ambulating with FWW at 50% WB. Is this a Progress Report:     []  Yes  [x]  No        If Yes:  Date Range for reporting period:  Beginning   22  Ending 22    Progress report will be due (10 Rx or 30 days whichever is less):        Recertification will be due (POC Duration  / 90 days whichever is less): 22          Visit # Insurance Allowable Auth Required   In Person  12 thru  []  Yes     []  No    [facility]   []  Yes     []  No    Total 3             Functional Scale: FOTO 37    Date assessed:       Latex Allergy:  [x]NO      []YES  Preferred Language for Healthcare:   [x]English       []other:      Pain level:  0/10     SUBJECTIVE:  Pt states everything feels good.        OBJECTIVE:       RESTRICTIONS/PRECAUTIONS: 50% WB    Exercises/Interventions: HEP code: [id number]  Therapeutic Ex () HEP 4/25/22 5/3/22 5/6/22   Warm-up       Rec bike   8' 8'          TABLE       Heel slides x x30     Adduction isometric x x30 x30 x30 SL CS x X30, BB X30, GTB --   SL Reverse CS       Pelvic tilts x x20 x30    Bridge x x20 x30    Wall slides x x20     Prone hip extension   x20 B x20 B   Prone knee flexion   x20    Prone ER    x20                               STANDING       Wedge gastroc stretch   1' 1'   Heel raises on 1/2 roll   x30 x30   Toe raises on 1/2 roll   x30 x30   Leg Press DL   X30, 80# X30, 80#   HS curl machine   X30, 35# X30, 45#   Knee extension machine   X30, 30# X30, 40#   Tandem standing    30\" ea                                                             Manual (45822): PROm to hip flexion, abduction, IR/ER and knee flexion, prone quad stretch 10'    Therapeutic Exercise and NMR EXR  [x] (53238) Provided verbal/tactile cueing for activities related to strengthening, flexibility, endurance, ROM for improvements in LE, proximal hip, and core control with self care, mobility, lifting, ambulation. [x] (69667) Provided verbal/tactile cueing for activities related to improving balance, coordination, kinesthetic sense, posture, motor skill, proprioception to assist with LE, proximal hip, and core control in self-care, mobility, lifting, ambulation and eccentric single leg control.      NMR and Therapeutic Activities:    [x] (61921 or 67515) Provided verbal/tactile cueing for activities related to improving balance, coordination, kinesthetic sense, posture, motor skill, proprioception and motor activation to allow for proper function of core, proximal hip and LE with self-care and ADLs and functional mobility.   [] (67477) Gait Re-education- Provided training and instruction to the patient for proper LE, core and proximal hip recruitment and positioning and eccentric body weight control with ambulation re-education including up and down stairs     Home Exercise Program:    [x] (60703) Reviewed/Progressed HEP activities related to strengthening, flexibility, endurance, ROM of core, proximal hip and LE for functional self-care, mobility, lifting and ambulation/stair navigation   [] (32019) Reviewed/Progressed HEP activities related to improving balance, coordination, kinesthetic sense, posture, motor skill, proprioception of core, proximal hip and LE for self-care, mobility, lifting, and ambulation/stair navigation      Manual Treatments:  PROM / STM / Oscillations-Mobs:  G-I, II, III, IV (PA's, Inf., Post.)  [x] (09335) Provided manual therapy to mobilize LE, proximal hip and/or LS spine soft tissue/joints for the purpose of modulating pain, promoting relaxation, increasing ROM, reducing/eliminating soft tissue swelling/inflammation/restriction, improving soft tissue extensibility and allowing for proper ROM for normal function with self-care, mobility, lifting and ambulation. Modalities:     [] GAME READY (VASO)- for significant edema, swelling, pain control. Charges:  Timed Code Treatment Minutes: 53   Total Treatment Minutes:  53   BWC:  TE TIME:  NMR TIME:  MANUAL TIME:   TA  UNTIMED MINUTES:  Medicare Total:   30  13  10            [] EVAL (LOW) 81444 (typically 20 minutes face-to-face)  [] EVAL (MOD) 13051 (typically 30 minutes face-to-face)  [] EVAL (HIGH) 58926 (typically 45 minutes face-to-face)  [] RE-EVAL     [x] WG(50527) 2     [] IONTO  [x] NMR (92125) x     [] VASO  [x] Manual (60604) 1     [] Dry Needle:  [] TA x      [] Mech Traction (15860)  [] ES(attended) (83710)      [] ES (un) (64642):        GOALS:     Patient stated goal: Improve mobility, return to work.      Therapist goals for Patient:   Short Term Goals: To be achieved in: 2 weeks  1. Independent in HEP and progression per patient tolerance, in order to prevent re-injury. []? Progressing: []? Met: []? Not Met: []? Adjusted      2. Patient will have a decrease in pain of NRPS to <2/10 facilitate improvement in movement, function, and ADLs as indicated by Functional Deficits. []? Progressing: []? Met: []? Not Met: []? Adjusted      Long Term Goals:  To be achieved in: 8 weeks  1. Pt will improve FOTO to 60 or more, indicating improved functional capacity. []? Progressing: []? Met: []? Not Met: []? Adjusted       2. Patient will demonstrate increased AROM to hip flex/knee flexion to 110/115 to allow for proper joint functioning as indicated by patients Functional Deficits. []? Progressing: []? Met: []? Not Met: []? Adjusted      3. Patient will demonstrate an increase in Strength to hip flexion  to 5/5 allow for proper functional mobility as indicated by patients Functional Deficits. []? Progressing: []? Met: []? Not Met: []? Adjusted      4. Patient will return to functional activities with NRPS of 0/10. []? Progressing: []? Met: []? Not Met: []? Adjusted      5. Pt will be able to ambulate stairs reciprocally (patient specific functional goal)    []? Progressing: []? Met: []? Not Met: []? Adjusted                ASSESSMENT:  Pt ventura session well. Able to make revolutions on bike today so knee ROM is improving. Patient received education on their current pathology and how their condition effects them with their functional activities. Patient understood discussion and questions were answered. Patient understands their activity limitations and understands rational for treatment progression. Pt educated on plan of care and HEP, if worsening symptoms to d/c that exercise. PLAN: See eval  [x] Continue per plan of care [] Alter current plan (see comments above)  [] Plan of care initiated [] Hold pending MD visit [] Discharge      Electronically signed by:  Zully Bright PT    Note: If patient does not return for scheduled/ recommended follow up visits, this note will serve as a discharge from care along with most recent update on progress.

## 2022-05-09 ENCOUNTER — APPOINTMENT (OUTPATIENT)
Dept: PHYSICAL THERAPY | Age: 67
End: 2022-05-09
Payer: COMMERCIAL

## 2022-05-09 ENCOUNTER — HOSPITAL ENCOUNTER (OUTPATIENT)
Dept: PHYSICAL THERAPY | Age: 67
Setting detail: THERAPIES SERIES
Discharge: HOME | End: 2022-05-09
Payer: COMMERCIAL

## 2022-05-09 PROCEDURE — 97112 NEUROMUSCULAR REEDUCATION: CPT

## 2022-05-09 PROCEDURE — 97110 THERAPEUTIC EXERCISES: CPT

## 2022-05-09 PROCEDURE — 97140 MANUAL THERAPY 1/> REGIONS: CPT

## 2022-05-09 NOTE — FLOWSHEET NOTE
[address] [place] [address] [facility], [address] [place] [address] [address]  Phone: [phone]   Fax:     [phone]      Physical Therapy Treatment Note/ Progress Report:           Date:  2022    Patient Name:  Ben Diaz    :  1955  MRN: [id number]  Restrictions/Precautions:    Medical/Treatment Diagnosis Information:  · Diagnosis: Left hip fracture Z87.81  · Treatment Diagnosis: Left hip pain, decreased ROm and strength  Insurance/Certification information:  PT Insurance Information: Workers comp, 12 visit thru   Physician Information[de-identified] David Umanzor  Has the plan of care been signed (Y/N):        []  Yes  []  No     Date of Patient follow up with Physician:     Assessment Summary: Asuncion Turner is a 73 y.o. male reporting to OP PT s/p left hip ORIF on 22 which occurred after fall at work. Pt is noted to have reduced hip and knee ROM. Moderate reduction in hip flexion strength. Still ambulating with FWW at 50% WB. Is this a Progress Report:     []  Yes  [x]  No        If Yes:  Date Range for reporting period:  Beginning   22  Ending 22    Progress report will be due (10 Rx or 30 days whichever is less): 8/10/18       Recertification will be due (POC Duration  / 90 days whichever is less): 22          Visit # Insurance Allowable Auth Required   In Person  12 thru  []  Yes     []  No    [facility]   []  Yes     []  No    Total 4             Functional Scale: FOTO 37    Date assessed:       Latex Allergy:  [x]NO      []YES  Preferred Language for Healthcare:   [x]English       []other:      Pain level:  0/10     SUBJECTIVE:  Pt states docotr said he can transition to full weight bearing.        OBJECTIVE:       RESTRICTIONS/PRECAUTIONS: 50% WB    Exercises/Interventions: HEP code: [id number]  Therapeutic Ex (52621) HEP 4/25/22 5/3/22 5/6/22 5/9/22   Warm-up        Rec bike   8' 8' 10'           TABLE        Heel slides x x30      Adduction isometric x x30 x30 x30 x30   SL CS x X30, BB X30, GTB --    SL Reverse CS        Pelvic tilts x x20 x30     Bridge x x20 x30  x30   Wall slides x x20      Prone hip extension   x20 B x20 B    Prone knee flexion   x20     Prone ER    x20                                    STANDING        Wedge gastroc stretch   1' 1' 1'   Heel raises on 1/2 roll   x30 x30 x30   Toe raises on 1/2 roll   x30 x30 x30   Leg Press DL   X30, 80# X30, 80# X30, 100#   Leg Press SL     X20, 60#   HS curl machine   X30, 35# X30, 45# X30, 50#   Knee extension machine   X30, 30# X30, 40# X30, 45#   Tandem standing    30\" ea    Anterior step ups     X20, 4\"   Lateral step ups     x15 B, 4\"   Gait training w/ SPC     5'                                             Manual (81780): PROm to hip flexion, abduction, IR/ER and knee flexion, prone quad stretch 10'    Therapeutic Exercise and NMR EXR  [x] (62163) Provided verbal/tactile cueing for activities related to strengthening, flexibility, endurance, ROM for improvements in LE, proximal hip, and core control with self care, mobility, lifting, ambulation. [x] (20426) Provided verbal/tactile cueing for activities related to improving balance, coordination, kinesthetic sense, posture, motor skill, proprioception to assist with LE, proximal hip, and core control in self-care, mobility, lifting, ambulation and eccentric single leg control.      NMR and Therapeutic Activities:    [x] (66709 or 95121) Provided verbal/tactile cueing for activities related to improving balance, coordination, kinesthetic sense, posture, motor skill, proprioception and motor activation to allow for proper function of core, proximal hip and LE with self-care and ADLs and functional mobility.   [] (96329) Gait Re-education- Provided training and instruction to the patient for proper LE, core and proximal hip recruitment and positioning and eccentric body weight control with ambulation re-education including up and down stairs     Home Exercise Program:    [x] (86483) Reviewed/Progressed HEP activities related to strengthening, flexibility, endurance, ROM of core, proximal hip and LE for functional self-care, mobility, lifting and ambulation/stair navigation   [] (25432) Reviewed/Progressed HEP activities related to improving balance, coordination, kinesthetic sense, posture, motor skill, proprioception of core, proximal hip and LE for self-care, mobility, lifting, and ambulation/stair navigation      Manual Treatments:  PROM / STM / Oscillations-Mobs:  G-I, II, III, IV (PA's, Inf., Post.)  [x] (40009) Provided manual therapy to mobilize LE, proximal hip and/or LS spine soft tissue/joints for the purpose of modulating pain, promoting relaxation, increasing ROM, reducing/eliminating soft tissue swelling/inflammation/restriction, improving soft tissue extensibility and allowing for proper ROM for normal function with self-care, mobility, lifting and ambulation. Modalities:     [] GAME READY (VASO)- for significant edema, swelling, pain control. Charges:  Timed Code Treatment Minutes: 53   Total Treatment Minutes:  53   BWC:  TE TIME:  NMR TIME:  MANUAL TIME:   TA  UNTIMED MINUTES:  Medicare Total:   30  13  10            [] EVAL (LOW) 50238 (typically 20 minutes face-to-face)  [] EVAL (MOD) 50292 (typically 30 minutes face-to-face)  [] EVAL (HIGH) 91473 (typically 45 minutes face-to-face)  [] RE-EVAL     [x] LO(10756) 2     [] IONTO  [x] NMR (89124) x     [] VASO  [x] Manual (08599) 1     [] Dry Needle:  [] TA x      [] [facility] Traction (28789)  [] ES(attended) (14457)      [] ES (un) (97545):        GOALS:     Patient stated goal: Improve mobility, return to work.      Therapist goals for Patient:   Short Term Goals: To be achieved in: 2 weeks  1. Independent in HEP and progression per patient tolerance, in order to prevent re-injury. []? Progressing: []? Met: []? Not Met: []? Adjusted      2.  Patient will have a decrease in pain of NRPS to <2/10 facilitate improvement in movement, function, and ADLs as indicated by Functional Deficits. []? Progressing: []? Met: []? Not Met: []? Adjusted      Long Term Goals: To be achieved in: 8 weeks  1. Pt will improve FOTO to 60 or more, indicating improved functional capacity. []? Progressing: []? Met: []? Not Met: []? Adjusted       2. Patient will demonstrate increased AROM to hip flex/knee flexion to 110/115 to allow for proper joint functioning as indicated by patients Functional Deficits. []? Progressing: []? Met: []? Not Met: []? Adjusted      3. Patient will demonstrate an increase in Strength to hip flexion  to 5/5 allow for proper functional mobility as indicated by patients Functional Deficits. []? Progressing: []? Met: []? Not Met: []? Adjusted      4. Patient will return to functional activities with NRPS of 0/10. []? Progressing: []? Met: []? Not Met: []? Adjusted      5. Pt will be able to ambulate stairs reciprocally (patient specific functional goal)    []? Progressing: []? Met: []? Not Met: []? Adjusted                ASSESSMENT:  Pt ventura session well. Worked on proper sequencing with SPC to increase safety. No ready yet for ambulating without AD. Patient received education on their current pathology and how their condition effects them with their functional activities. Patient understood discussion and questions were answered. Patient understands their activity limitations and understands rational for treatment progression. Pt educated on plan of care and HEP, if worsening symptoms to d/c that exercise.            PLAN: See eval  [x] Continue per plan of care [] Alter current plan (see comments above)  [] Plan of care initiated [] Hold pending MD visit [] Discharge      Electronically signed by:  Connie Keen PT    Note: If patient does not return for scheduled/ recommended follow up visits, this note will serve as a discharge from care along with most recent update on progress.

## 2022-05-13 ENCOUNTER — HOSPITAL ENCOUNTER (OUTPATIENT)
Dept: PHYSICAL THERAPY | Age: 67
Setting detail: THERAPIES SERIES
Discharge: HOME | End: 2022-05-13
Payer: COMMERCIAL

## 2022-05-13 PROCEDURE — 97110 THERAPEUTIC EXERCISES: CPT

## 2022-05-13 PROCEDURE — 97140 MANUAL THERAPY 1/> REGIONS: CPT

## 2022-05-13 PROCEDURE — 97112 NEUROMUSCULAR REEDUCATION: CPT

## 2022-05-13 NOTE — FLOWSHEET NOTE
[address] [place] [address] and Sports Rehabilitation[address], [address] [place] [address] [address]  Phone: [phone]   Fax:     [phone]      Physical Therapy Treatment Note/ Progress Report:           Date:  2022    Patient Name:  Ander Hale    :  1955  MRN: [id number]  Restrictions/Precautions:    Medical/Treatment Diagnosis Information:  · Diagnosis: Left hip fracture Z87.81  · Treatment Diagnosis: Left hip pain, decreased ROm and strength  Insurance/Certification information:  PT Insurance Information: Workers comp, 12 visit thru   Physician Information[de-identified] Isabel Hunt  Has the plan of care been signed (Y/N):        []  Yes  []  No     Date of Patient follow up with Physician:     Assessment Summary: Abhilash Padilla is a 73 y.o. male reporting to OP PT s/p left hip ORIF on 22 which occurred after fall at work. Pt is noted to have reduced hip and knee ROM. Moderate reduction in hip flexion strength. Still ambulating with FWW at 50% WB.        Is this a Progress Report:     []  Yes  [x]  No        If Yes:  Date Range for reporting period:  Beginning   22  Ending 22    Progress report will be due (10 Rx or 30 days whichever is less): 26       Recertification will be due (POC Duration  / 90 days whichever is less): 22          Visit # Insurance Allowable Auth Required   In Person  12 thru  []  Yes     []  No    [facility]   []  Yes     []  No    Total 5             Functional Scale: FOTO 37    Date assessed:       Latex Allergy:  [x]NO      []YES  Preferred Language for Healthcare:   [x]English       []other:      Pain level:  0/10     SUBJECTIVE:  Pt states       OBJECTIVE:       RESTRICTIONS/PRECAUTIONS: 50% WB    Exercises/Interventions: HEP code: [id number]  Therapeutic Ex (96563) HEP 5/3/22 5/6/22 5/9/22 5/13/22   Warm-up        Rec bike  8' 8' 10' 10'           TABLE        Heel slides x       Adduction isometric x x30 x30 x30 x30   SL CS x X30, GTB --     SL Reverse CS        Pelvic tilts x x30      Bridge x x30  x30 x30   Wall slides x       Prone hip extension  x20 B x20 B  x20 B   Prone knee flexion  x20      Prone ER   x20                                     STANDING        Wedge gastroc stretch  1' 1' 1' 1'   Heel raises on 1/2 roll  x30 x30 x30 x30   Toe raises on 1/2 roll  x30 x30 x30 x30   Leg Press DL  X30, 80# X30, 80# X30, 100# X30, 105#   Leg Press SL    X20, 60# X20, 80#   HS curl machine  X30, 35# X30, 45# X30, 50# X30, 55#   Knee extension machine  X30, 30# X30, 40# X30, 45# X30, 60#   Tandem standing   30\" ea     Anterior step ups    X20, 4\" X30, 4\"   Lateral step ups    x15 B, 4\" x15 B, 4\"   Anterior step down     X20, 4\"   BOSU balance     1'           Gait training w/ SPC    5'                                              Manual (82591): PROm to hip flexion, abduction, IR/ER and knee flexion, prone quad stretch 10'    Therapeutic Exercise and NMR EXR  [x] (21652) Provided verbal/tactile cueing for activities related to strengthening, flexibility, endurance, ROM for improvements in LE, proximal hip, and core control with self care, mobility, lifting, ambulation. [x] (77372) Provided verbal/tactile cueing for activities related to improving balance, coordination, kinesthetic sense, posture, motor skill, proprioception to assist with LE, proximal hip, and core control in self-care, mobility, lifting, ambulation and eccentric single leg control.      NMR and Therapeutic Activities:    [x] (29872 or 50375) Provided verbal/tactile cueing for activities related to improving balance, coordination, kinesthetic sense, posture, motor skill, proprioception and motor activation to allow for proper function of core, proximal hip and LE with self-care and ADLs and functional mobility.   [] (59082) Gait Re-education- Provided training and instruction to the patient for proper LE, core and proximal hip recruitment and positioning and eccentric body weight control with ambulation re-education including up and down stairs     Home Exercise Program:    [x] (94603) Reviewed/Progressed HEP activities related to strengthening, flexibility, endurance, ROM of core, proximal hip and LE for functional self-care, mobility, lifting and ambulation/stair navigation   [] (97387) Reviewed/Progressed HEP activities related to improving balance, coordination, kinesthetic sense, posture, motor skill, proprioception of core, proximal hip and LE for self-care, mobility, lifting, and ambulation/stair navigation      Manual Treatments:  PROM / STM / Oscillations-Mobs:  G-I, II, III, IV (PA's, Inf., Post.)  [x] (60518) Provided manual therapy to mobilize LE, proximal hip and/or LS spine soft tissue/joints for the purpose of modulating pain, promoting relaxation, increasing ROM, reducing/eliminating soft tissue swelling/inflammation/restriction, improving soft tissue extensibility and allowing for proper ROM for normal function with self-care, mobility, lifting and ambulation. Modalities:     [] GAME READY (VASO)- for significant edema, swelling, pain control. Charges:  Timed Code Treatment Minutes: 53   Total Treatment Minutes:  53   BWC:  TE TIME:  NMR TIME:  MANUAL TIME:   TA  UNTIMED MINUTES:  Medicare Total:   30  13  10            [] EVAL (LOW) 14282 (typically 20 minutes face-to-face)  [] EVAL (MOD) 44802 (typically 30 minutes face-to-face)  [] EVAL (HIGH) 03671 (typically 45 minutes face-to-face)  [] RE-EVAL     [x] BYRON(83046) 2     [] IONTO  [x] NMR (81223) x     [] VASO  [x] Manual (26282) 1     [] Dry Needle:  [] TA x      [] Mech Traction (38332)  [] ES(attended) (30695)      [] ES (un) (31238):        GOALS:     Patient stated goal: Improve mobility, return to work.      Therapist goals for Patient:   Short Term Goals: To be achieved in: 2 weeks  1. Independent in HEP and progression per patient tolerance, in order to prevent re-injury. []?  Progressing: []? Met: []? Not Met: []? Adjusted      2. Patient will have a decrease in pain of NRPS to <2/10 facilitate improvement in movement, function, and ADLs as indicated by Functional Deficits. []? Progressing: []? Met: []? Not Met: []? Adjusted      Long Term Goals: To be achieved in: 8 weeks  1. Pt will improve FOTO to 60 or more, indicating improved functional capacity. []? Progressing: []? Met: []? Not Met: []? Adjusted       2. Patient will demonstrate increased AROM to hip flex/knee flexion to 110/115 to allow for proper joint functioning as indicated by patients Functional Deficits. []? Progressing: []? Met: []? Not Met: []? Adjusted      3. Patient will demonstrate an increase in Strength to hip flexion  to 5/5 allow for proper functional mobility as indicated by patients Functional Deficits. []? Progressing: []? Met: []? Not Met: []? Adjusted      4. Patient will return to functional activities with NRPS of 0/10. []? Progressing: []? Met: []? Not Met: []? Adjusted      5. Pt will be able to ambulate stairs reciprocally (patient specific functional goal)    []? Progressing: []? Met: []? Not Met: []? Adjusted                ASSESSMENT:  Pt ventura session well. Functional mobility continues to improve. Patient received education on their current pathology and how their condition effects them with their functional activities. Patient understood discussion and questions were answered. Patient understands their activity limitations and understands rational for treatment progression. Pt educated on plan of care and HEP, if worsening symptoms to d/c that exercise.            PLAN: See eval  [x] Continue per plan of care [] Alter current plan (see comments above)  [] Plan of care initiated [] Hold pending MD visit [] Discharge      Electronically signed by:  Jocelyn Yeager PT    Note: If patient does not return for scheduled/ recommended follow up visits, this note will serve as a discharge from care along with most recent update on progress.

## 2022-05-17 ENCOUNTER — APPOINTMENT (OUTPATIENT)
Dept: PHYSICAL THERAPY | Age: 67
End: 2022-05-17
Payer: COMMERCIAL

## 2022-05-17 ENCOUNTER — HOSPITAL ENCOUNTER (OUTPATIENT)
Dept: PHYSICAL THERAPY | Age: 67
Setting detail: THERAPIES SERIES
Discharge: HOME | End: 2022-05-17
Payer: COMMERCIAL

## 2022-05-17 PROCEDURE — 97140 MANUAL THERAPY 1/> REGIONS: CPT

## 2022-05-17 PROCEDURE — 97110 THERAPEUTIC EXERCISES: CPT

## 2022-05-17 PROCEDURE — 97112 NEUROMUSCULAR REEDUCATION: CPT

## 2022-05-17 NOTE — FLOWSHEET NOTE
[address] [place] [address] and Sports Rehabilitation[address], [address] [place] [address] [address]  Phone: [phone]   Fax:     [phone]      Physical Therapy Treatment Note/ Progress Report:           Date:  2022    Patient Name:  Ander Hale    :  1955  MRN: [id number]  Restrictions/Precautions:    Medical/Treatment Diagnosis Information:  · Diagnosis: Left hip fracture Z87.81  · Treatment Diagnosis: Left hip pain, decreased ROm and strength  Insurance/Certification information:  PT Insurance Information: Workers comp, 12 visit thru   Physician Information[de-identified] Isabel Hunt  Has the plan of care been signed (Y/N):        []  Yes  []  No     Date of Patient follow up with Physician:     Assessment Summary: Abhilash Padilla is a 73 y.o. male reporting to OP PT s/p left hip ORIF on 22 which occurred after fall at work. Pt is noted to have reduced hip and knee ROM. Moderate reduction in hip flexion strength. Still ambulating with FWW at 50% WB. Is this a Progress Report:     []  Yes  [x]  No        If Yes:  Date Range for reporting period:  Beginning   22  Ending 22    Progress report will be due (10 Rx or 30 days whichever is less): 3/25/78       Recertification will be due (POC Duration  / 90 days whichever is less): 22          Visit # Insurance Allowable Auth Required   In Person  12 thru  []  Yes     []  No    Tele Health   []  Yes     []  No    Total 6             Functional Scale: FOTO 37, FOTO 60    Date assessed:       Latex Allergy:  [x]NO      []YES  Preferred Language for Healthcare:   [x]English       []other:      Pain level:  0/10     SUBJECTIVE:  Pt states continues to improve. Gets tired walking longer distances.        OBJECTIVE:     Knee flexion 120    Hip flexion 105      RESTRICTIONS/PRECAUTIONS: 50% WB    Exercises/Interventions: HEP code: [id number]  Therapeutic Ex (32220) HEP 22   Warm-up       Rec bike 10' 10' 10'          TABLE       Heel slides x      Adduction isometric x x30 x30 x30   SL CS x   x30   SL Reverse CS       Pelvic tilts x      Bridge x x30 x30 x30   Wall slides x      Prone hip extension   x20 B    Prone knee flexion       Prone ER                                   STANDING       Wedge gastroc stretch  1' 1' 1'   Heel raises on 1/2 roll  x30 x30 x30   Toe raises on 1/2 roll  x30 x30 x30   Leg Press DL  X30, 100# X30, 105# X20, 120#   Leg Press SL  X20, 60# X20, 80# x20 B, 80#   HS curl machine  X30, 50# X30, 55#    Knee extension machine  X30, 45# X30, 60#    Tandem standing    --   Anterior step ups  X20, 4\" X30, 4\" --   Lateral step ups  x15 B, 4\" x15 B, 4\" x15 B, 6\"   Anterior step down   X20, 4\" --   BOSU balance   1' 1'   BOSU marches    x30   Retrowalk CC    X10, 45#   Gait training w/ SPC  5'                                          Manual (91664): PROm to hip flexion, abduction, IR/ER and knee flexion, prone quad stretch 10'    Therapeutic Exercise and NMR EXR  [x] (01531) Provided verbal/tactile cueing for activities related to strengthening, flexibility, endurance, ROM for improvements in LE, proximal hip, and core control with self care, mobility, lifting, ambulation. [x] (81007) Provided verbal/tactile cueing for activities related to improving balance, coordination, kinesthetic sense, posture, motor skill, proprioception to assist with LE, proximal hip, and core control in self-care, mobility, lifting, ambulation and eccentric single leg control.      NMR and Therapeutic Activities:    [x] (77189 or 98458) Provided verbal/tactile cueing for activities related to improving balance, coordination, kinesthetic sense, posture, motor skill, proprioception and motor activation to allow for proper function of core, proximal hip and LE with self-care and ADLs and functional mobility.   [] (79773) Gait Re-education- Provided training and instruction to the patient for proper LE, core and proximal hip recruitment and positioning and eccentric body weight control with ambulation re-education including up and down stairs     Home Exercise Program:    [x] (41093) Reviewed/Progressed HEP activities related to strengthening, flexibility, endurance, ROM of core, proximal hip and LE for functional self-care, mobility, lifting and ambulation/stair navigation   [] (16671) Reviewed/Progressed HEP activities related to improving balance, coordination, kinesthetic sense, posture, motor skill, proprioception of core, proximal hip and LE for self-care, mobility, lifting, and ambulation/stair navigation      Manual Treatments:  PROM / STM / Oscillations-Mobs:  G-I, II, III, IV (PA's, Inf., Post.)  [x] (08562) Provided manual therapy to mobilize LE, proximal hip and/or LS spine soft tissue/joints for the purpose of modulating pain, promoting relaxation, increasing ROM, reducing/eliminating soft tissue swelling/inflammation/restriction, improving soft tissue extensibility and allowing for proper ROM for normal function with self-care, mobility, lifting and ambulation. Modalities:     [] GAME READY (VASO)- for significant edema, swelling, pain control. Charges:  Timed Code Treatment Minutes: 53   Total Treatment Minutes:  53   BWC:  TE TIME:  NMR TIME:  MANUAL TIME:   TA  UNTIMED MINUTES:  Medicare Total:   30  13  10            [] EVAL (LOW) 93796 (typically 20 minutes face-to-face)  [] EVAL (MOD) 32737 (typically 30 minutes face-to-face)  [] EVAL (HIGH) 18221 (typically 45 minutes face-to-face)  [] RE-EVAL     [x] TN(61796) 2     [] IONTO  [x] NMR (38810) x     [] VASO  [x] Manual (01271) 1     [] Dry Needle:  [] TA x      [] Mech Traction (87659)  [] ES(attended) (31551)      [] ES (un) (05443):        GOALS:     Patient stated goal: Improve mobility, return to work.      Therapist goals for Patient:   Short Term Goals: To be achieved in: 2 weeks  1.  Independent in HEP and progression per patient tolerance, in order to prevent re-injury. []? Progressing: []? Met: []? Not Met: []? Adjusted      2. Patient will have a decrease in pain of NRPS to <2/10 facilitate improvement in movement, function, and ADLs as indicated by Functional Deficits. []? Progressing: []? Met: []? Not Met: []? Adjusted      Long Term Goals: To be achieved in: 8 weeks  1. Pt will improve FOTO to 60 or more, indicating improved functional capacity. []? Progressing: [x]? Met: []? Not Met: []? Adjusted       2. Patient will demonstrate increased AROM to hip flex/knee flexion to 110/115 to allow for proper joint functioning as indicated by patients Functional Deficits. []? Progressing: []? Met: []? Not Met: []? Adjusted      3. Patient will demonstrate an increase in Strength to hip flexion  to 5/5 allow for proper functional mobility as indicated by patients Functional Deficits. []? Progressing: []? Met: []? Not Met: []? Adjusted      4. Patient will return to functional activities with NRPS of 0/10. []? Progressing: []? Met: []? Not Met: []? Adjusted      5. Pt will be able to ambulate stairs reciprocally (patient specific functional goal)    []? Progressing: []? Met: []? Not Met: []? Adjusted                ASSESSMENT:  Pt ventura session well. Functional mobility continues to improve. Significant improvement in FOTO score. Patient received education on their current pathology and how their condition effects them with their functional activities. Patient understood discussion and questions were answered. Patient understands their activity limitations and understands rational for treatment progression. Pt educated on plan of care and HEP, if worsening symptoms to d/c that exercise.            PLAN: See eval  [x] Continue per plan of care [] Alter current plan (see comments above)  [] Plan of care initiated [] Hold pending MD visit [] Discharge      Electronically signed by:  Sly Su PT    Note: If patient does not return for scheduled/ recommended follow up visits, this note will serve as a discharge from care along with most recent update on progress.

## 2022-05-20 ENCOUNTER — HOSPITAL ENCOUNTER (OUTPATIENT)
Dept: PHYSICAL THERAPY | Age: 67
Setting detail: THERAPIES SERIES
Discharge: HOME | End: 2022-05-20
Payer: COMMERCIAL

## 2022-05-20 PROCEDURE — 97110 THERAPEUTIC EXERCISES: CPT

## 2022-05-20 PROCEDURE — 97112 NEUROMUSCULAR REEDUCATION: CPT

## 2022-05-20 PROCEDURE — 97140 MANUAL THERAPY 1/> REGIONS: CPT

## 2022-05-20 NOTE — FLOWSHEET NOTE
[address] [place] [address] and Sports Rehabilitation[address], [address] [place] [address] [address]  Phone: [phone]   Fax:     [phone]      Physical Therapy Treatment Note/ Progress Report:           Date:  2022    Patient Name:  Sue Dejesus    :  1955  MRN: [id number]  Restrictions/Precautions:    Medical/Treatment Diagnosis Information:  · Diagnosis: Left hip fracture Z87.81  · Treatment Diagnosis: Left hip pain, decreased ROm and strength  Insurance/Certification information:  PT Insurance Information: Workers comp, 12 visit thru   Physician Information[de-identified] Silvio Juarez  Has the plan of care been signed (Y/N):        []  Yes  []  No     Date of Patient follow up with Physician:     Assessment Summary: Yancey Severs is a 73 y.o. male reporting to OP PT s/p left hip ORIF on 22 which occurred after fall at work. Pt is noted to have reduced hip and knee ROM. Moderate reduction in hip flexion strength. Still ambulating with FWW at 50% WB. Is this a Progress Report:     []  Yes  [x]  No        If Yes:  Date Range for reporting period:  Beginning   22  Ending 22    Progress report will be due (10 Rx or 30 days whichever is less):        Recertification will be due (POC Duration  / 90 days whichever is less): 22          Visit # Insurance Allowable Auth Required   In Person  12 thru  []  Yes     []  No    [facility]   []  Yes     []  No    Total 7             Functional Scale: FOTO 37, FOTO 60    Date assessed:       Latex Allergy:  [x]NO      []YES  Preferred Language for Healthcare:   [x]English       []other:      Pain level:  0/10     SUBJECTIVE:  Pt states hip feels good.        OBJECTIVE:     Knee flexion 120    Hip flexion 105      RESTRICTIONS/PRECAUTIONS: 50% WB    Exercises/Interventions: HEP code: [id number]  Therapeutic Ex (23094) HEP 22/   Warm-up        Rec bike  10' 10' 10' 10'           TABLE Adduction isometric x x30 x30 x30 x30   SL CS x   x30 X30, BTB   SL Reverse CS     x30   Bridge x x30 x30 x30 x30   Prone hip extension   x20 B                                     STANDING        Wedge gastroc stretch  1' 1' 1' 1'   Heel raises on 1/2 roll  x30 x30 x30 x30   Toe raises on 1/2 roll  x30 x30 x30 x30   Leg Press DL  X30, 100# X30, 105# X20, 120# X20, 125#   Leg Press SL  X20, 60# X20, 80# x20 B, 80# X20, 85#   HS curl machine  X30, 50# X30, 55#  X30, 60#   Knee extension machine  X30, 45# X30, 60#  X30, 60#   Tandem standing    -- --   Anterior step ups  X20, 4\" X30, 4\" -- X20, 6\"   Lateral step ups  x15 B, 4\" x15 B, 4\" x15 B, 6\" x15 B, 6\"   Anterior step down   X20, 4\" -- --   BOSU balance   1' 1' 1'   BOSU squats     x15   BOSU marches    x30    Retrowalk CC    X10, 45# X15, 45#   Gait training w/ SPC  5'                                                Manual (74554): PROm to hip flexion, abduction, IR/ER and knee flexion, prone quad stretch 10'    Therapeutic Exercise and NMR EXR  [x] (83745) Provided verbal/tactile cueing for activities related to strengthening, flexibility, endurance, ROM for improvements in LE, proximal hip, and core control with self care, mobility, lifting, ambulation. [x] (60885) Provided verbal/tactile cueing for activities related to improving balance, coordination, kinesthetic sense, posture, motor skill, proprioception to assist with LE, proximal hip, and core control in self-care, mobility, lifting, ambulation and eccentric single leg control.      NMR and Therapeutic Activities:    [x] (31431 or 99031) Provided verbal/tactile cueing for activities related to improving balance, coordination, kinesthetic sense, posture, motor skill, proprioception and motor activation to allow for proper function of core, proximal hip and LE with self-care and ADLs and functional mobility.   [] (08101) Gait Re-education- Provided training and instruction to the patient for proper LE, core and proximal hip recruitment and positioning and eccentric body weight control with ambulation re-education including up and down stairs     Home Exercise Program:    [x] (71073) Reviewed/Progressed HEP activities related to strengthening, flexibility, endurance, ROM of core, proximal hip and LE for functional self-care, mobility, lifting and ambulation/stair navigation   [] (31311) Reviewed/Progressed HEP activities related to improving balance, coordination, kinesthetic sense, posture, motor skill, proprioception of core, proximal hip and LE for self-care, mobility, lifting, and ambulation/stair navigation      Manual Treatments:  PROM / STM / Oscillations-Mobs:  G-I, II, III, IV (PA's, Inf., Post.)  [x] (01516) Provided manual therapy to mobilize LE, proximal hip and/or LS spine soft tissue/joints for the purpose of modulating pain, promoting relaxation, increasing ROM, reducing/eliminating soft tissue swelling/inflammation/restriction, improving soft tissue extensibility and allowing for proper ROM for normal function with self-care, mobility, lifting and ambulation. Modalities:     [] GAME READY (VASO)- for significant edema, swelling, pain control. Charges:  Timed Code Treatment Minutes: 53   Total Treatment Minutes:  53   BWC:  TE TIME:  NMR TIME:  MANUAL TIME:   TA  UNTIMED MINUTES:  Medicare Total:   30  13  10            [] EVAL (LOW) 66379 (typically 20 minutes face-to-face)  [] EVAL (MOD) 05122 (typically 30 minutes face-to-face)  [] EVAL (HIGH) 88665 (typically 45 minutes face-to-face)  [] RE-EVAL     [x] TM(31869) 2     [] IONTO  [x] NMR (18746) x     [] VASO  [x] Manual (63259) 1     [] Dry Needle:  [] TA x      [] Mech Traction (51822)  [] ES(attended) (47194)      [] ES (un) (66755):        GOALS:     Patient stated goal: Improve mobility, return to work.      Therapist goals for Patient:   Short Term Goals: To be achieved in: 2 weeks  1.  Independent in HEP and progression per patient tolerance, in order to prevent re-injury. []? Progressing: [x]? Met: []? Not Met: []? Adjusted      2. Patient will have a decrease in pain of NRPS to <2/10 facilitate improvement in movement, function, and ADLs as indicated by Functional Deficits. []? Progressing: [x]? Met: []? Not Met: []? Adjusted      Long Term Goals: To be achieved in: 8 weeks  1. Pt will improve FOTO to 60 or more, indicating improved functional capacity. []? Progressing: [x]? Met: []? Not Met: []? Adjusted       2. Patient will demonstrate increased AROM to hip flex/knee flexion to 110/115 to allow for proper joint functioning as indicated by patients Functional Deficits. [x]? Progressing: []? Met: []? Not Met: []? Adjusted      3. Patient will demonstrate an increase in Strength to hip flexion  to 5/5 allow for proper functional mobility as indicated by patients Functional Deficits. []? Progressing: []? Met: []? Not Met: []? Adjusted      4. Patient will return to functional activities with NRPS of 0/10. []? Progressing: []? Met: []? Not Met: []? Adjusted      5. Pt will be able to ambulate stairs reciprocally (patient specific functional goal)    []? Progressing: []? Met: []? Not Met: []? Adjusted                ASSESSMENT:  Hailey Bal has been seen in PT for 7 visits following left hip ORIF. Pt has responded very well overall to PT session which have been addressing ROM, progressive strengthening, balance and gait training. Flexion and IR ROM still limited. Pt will continue to benefit from PT addressing deficits in order to regain full function and improve QOL. Patient received education on their current pathology and how their condition effects them with their functional activities. Patient understood discussion and questions were answered. Patient understands their activity limitations and understands rational for treatment progression. Pt educated on plan of care and HEP, if worsening symptoms to d/c that exercise. PLAN: See eval  [x] Continue per plan of care [] Alter current plan (see comments above)  [] Plan of care initiated [] Hold pending MD visit [] Discharge      Electronically signed by:  Delmar Otero PT    Note: If patient does not return for scheduled/ recommended follow up visits, this note will serve as a discharge from care along with most recent update on progress.

## 2022-05-20 NOTE — PROGRESS NOTES
[address] [place] [address] and Sports Rehabilitation[address], [address] [place] [address] [address]  Phone: [phone]   Fax: [phone]    Date: 2022          Patient Name; :  Rachel Hennessy; 1955   Dx: Diagnosis: Left hip fracture Z87.81      Physician[de-identified] Ajith Delgado        Total PT Visits: 7     Measures Previous Current   Pain (0-10)     Disability % 37 60         Assessment:  Lona Martin has been seen in PT for 7 visits following left hip ORIF. Pt has responded very well overall to PT session which have been addressing ROM, progressive strengthening, balance and gait training. Flexion and IR ROM still limited. Pt will continue to benefit from PT addressing deficits in order to regain full function and improve QOL. Prognosis for POC: [x] Good [] Fair  [] Poor      Patient requires continued skilled intervention: [] Yes  [] No        Plan & Recommendations:  [x] Continue rehabilitation due to objective improvement and continued functional deficits with frequency and duration:   [] Progress toward  []GAP, []Work Conditioning, []Independent HEP   [] Discharge due to   [] All goals achieved, [] Maximized \"medical necessity\" [] No subjective or objective improvements      Electronically signed by:  Emory Davis PT  Therapy Plan of Care Re-Certification  This patient has been re-evaluated for physical therapy services and for therapy to continue, Medicare, Medicaid and other insurances require periodic physician review of the treatment plan.  Please review the above re-evaluation and verify that you agree with plan of care as established above by signing the attached document and return it to our office or note changes to established plan below  [] Follow treatment plan as above [] Discontinue physical therapy  [] Change plan to:                                 __________________________________________________    Physician Signature:____________________________________ Date:____________  By signing above, therapists plan is approved by physician    If you have any questions or concerns, please don't hesitate to call.   Thank you for your referral.

## 2022-05-23 ENCOUNTER — HOSPITAL ENCOUNTER (OUTPATIENT)
Dept: PHYSICAL THERAPY | Age: 67
Setting detail: THERAPIES SERIES
Discharge: HOME | End: 2022-05-23
Payer: COMMERCIAL

## 2022-05-23 PROCEDURE — 97110 THERAPEUTIC EXERCISES: CPT

## 2022-05-23 PROCEDURE — 97140 MANUAL THERAPY 1/> REGIONS: CPT

## 2022-05-23 PROCEDURE — 97112 NEUROMUSCULAR REEDUCATION: CPT

## 2022-05-23 NOTE — FLOWSHEET NOTE
[address] [place] [address] and Sports Rehabilitation[address], [address] [place] [address] [address]  Phone: [phone]   Fax:     [phone]      Physical Therapy Treatment Note/ Progress Report:           Date:  2022    Patient Name:  Dai Meeks    :  1955  MRN: [id number]  Restrictions/Precautions:    Medical/Treatment Diagnosis Information:  · Diagnosis: Left hip fracture Z87.81  · Treatment Diagnosis: Left hip pain, decreased ROm and strength  Insurance/Certification information:  PT Insurance Information: Workers comp, 12 visit thru   Physician Information[de-identified] Shanelle Arroyo  Has the plan of care been signed (Y/N):        []  Yes  []  No     Date of Patient follow up with Physician:     Assessment Summary: Juan Serrano is a 73 y.o. male reporting to OP PT s/p left hip ORIF on 22 which occurred after fall at work. Pt is noted to have reduced hip and knee ROM. Moderate reduction in hip flexion strength. Still ambulating with FWW at 50% WB. Is this a Progress Report:     []  Yes  [x]  No        If Yes:  Date Range for reporting period:  Beginning   22  Ending 22    Progress report will be due (10 Rx or 30 days whichever is less):        Recertification will be due (POC Duration  / 90 days whichever is less): 22          Visit # Insurance Allowable Auth Required   In Person  12 thru  []  Yes     []  No    [facility]   []  Yes     []  No    Total 8             Functional Scale: FOTO 37, FOTO 60    Date assessed:       Latex Allergy:  [x]NO      []YES  Preferred Language for Healthcare:   [x]English       []other:      Pain level:  0/10     SUBJECTIVE:  Pt states hip has good and bad days.        OBJECTIVE:     Knee flexion 120    Hip flexion 105      RESTRICTIONS/PRECAUTIONS: 50% WB    Exercises/Interventions: HEP code: [id number]  Therapeutic Ex (79970) HEP 22/ 22   Warm-up       Rec bike  10' 10' 10', Lv 5          TABLE Adduction isometric x x30 x30 x30   SL CS x x30 X30, BTB x20 B, BTB   SL Reverse CS   x30 x20 B   Bridge x x30 x30 x30   Prone hip extension    x20 B                               STANDING       Wedge gastroc stretch  1' 1' 1'   Heel raises on 1/2 roll  x30 x30 x30   Toe raises on 1/2 roll  x30 x30 x30   Leg Press DL  X20, 120# X20, 125# X20, 130#   Leg Press SL  x20 B, 80# X20, 85# X20 B, 85#   HS curl machine   X30, 60#    Knee extension machine   X30, 60#    Tandem standing  -- --    Anterior step ups  -- X20, 6\" X20, 6\"   Lateral step ups  x15 B, 6\" x15 B, 6\"    Anterior step down  -- --    Rotational step up    X20, 6\"   BOSU balance  1' 1' 1'   BOSU squats   x15 x15   BOSU marches  x30  x30   Retrowalk CC  X10, 45# X15, 45#    Gait training     2'   [facility]    x20 B, 45#   BITA Abduction    x20 B, 45#   BITA Flexion    x20 B, 45#                   Manual (83327): PROm to hip flexion, abduction, IR/ER and knee flexion, prone quad stretch 10'    Therapeutic Exercise and NMR EXR  [x] (08225) Provided verbal/tactile cueing for activities related to strengthening, flexibility, endurance, ROM for improvements in LE, proximal hip, and core control with self care, mobility, lifting, ambulation. [x] (27482) Provided verbal/tactile cueing for activities related to improving balance, coordination, kinesthetic sense, posture, motor skill, proprioception to assist with LE, proximal hip, and core control in self-care, mobility, lifting, ambulation and eccentric single leg control.      NMR and Therapeutic Activities:    [x] (51154 or 05185) Provided verbal/tactile cueing for activities related to improving balance, coordination, kinesthetic sense, posture, motor skill, proprioception and motor activation to allow for proper function of core, proximal hip and LE with self-care and ADLs and functional mobility.   [] (43772) Gait Re-education- Provided training and instruction to the patient for proper LE, core and proximal hip recruitment and positioning and eccentric body weight control with ambulation re-education including up and down stairs     Home Exercise Program:    [x] (06070) Reviewed/Progressed HEP activities related to strengthening, flexibility, endurance, ROM of core, proximal hip and LE for functional self-care, mobility, lifting and ambulation/stair navigation   [] (65745) Reviewed/Progressed HEP activities related to improving balance, coordination, kinesthetic sense, posture, motor skill, proprioception of core, proximal hip and LE for self-care, mobility, lifting, and ambulation/stair navigation      Manual Treatments:  PROM / STM / Oscillations-Mobs:  G-I, II, III, IV (PA's, Inf., Post.)  [x] (86030) Provided manual therapy to mobilize LE, proximal hip and/or LS spine soft tissue/joints for the purpose of modulating pain, promoting relaxation, increasing ROM, reducing/eliminating soft tissue swelling/inflammation/restriction, improving soft tissue extensibility and allowing for proper ROM for normal function with self-care, mobility, lifting and ambulation. Modalities:     [] GAME READY (VASO)- for significant edema, swelling, pain control. Charges:  Timed Code Treatment Minutes: 53   Total Treatment Minutes:  53   BWC:  TE TIME:  NMR TIME:  MANUAL TIME:   TA  UNTIMED MINUTES:  Medicare Total:   30  13  10            [] EVAL (LOW) 82171 (typically 20 minutes face-to-face)  [] EVAL (MOD) 38283 (typically 30 minutes face-to-face)  [] EVAL (HIGH) 99557 (typically 45 minutes face-to-face)  [] RE-EVAL     [x] OS(49717) 2     [] IONTO  [x] NMR (90802) x     [] VASO  [x] Manual (65296) 1     [] Dry Needle:  [] TA x      [] Mech Traction (55647)  [] ES(attended) (83690)      [] ES (un) (29605):        GOALS:     Patient stated goal: Improve mobility, return to work.      Therapist goals for Patient:   Short Term Goals: To be achieved in: 2 weeks  1.  Independent in HEP and progression per patient tolerance, in order to prevent re-injury. []? Progressing: [x]? Met: []? Not Met: []? Adjusted      2. Patient will have a decrease in pain of NRPS to <2/10 facilitate improvement in movement, function, and ADLs as indicated by Functional Deficits. []? Progressing: [x]? Met: []? Not Met: []? Adjusted      Long Term Goals: To be achieved in: 8 weeks  1. Pt will improve FOTO to 60 or more, indicating improved functional capacity. []? Progressing: [x]? Met: []? Not Met: []? Adjusted       2. Patient will demonstrate increased AROM to hip flex/knee flexion to 110/115 to allow for proper joint functioning as indicated by patients Functional Deficits. [x]? Progressing: []? Met: []? Not Met: []? Adjusted      3. Patient will demonstrate an increase in Strength to hip flexion  to 5/5 allow for proper functional mobility as indicated by patients Functional Deficits. []? Progressing: []? Met: []? Not Met: []? Adjusted      4. Patient will return to functional activities with NRPS of 0/10. []? Progressing: []? Met: []? Not Met: []? Adjusted      5. Pt will be able to ambulate stairs reciprocally (patient specific functional goal)    []? Progressing: []? Met: []? Not Met: []? Adjusted                ASSESSMENT:  Teddy Dia session well. Worked on increasing gait spped to help facilitate long step length and reduce gait deviation. Patient received education on their current pathology and how their condition effects them with their functional activities. Patient understood discussion and questions were answered. Patient understands their activity limitations and understands rational for treatment progression. Pt educated on plan of care and HEP, if worsening symptoms to d/c that exercise.            PLAN: See eval  [x] Continue per plan of care [] Alter current plan (see comments above)  [] Plan of care initiated [] Hold pending MD visit [] Discharge      Electronically signed by:  Connie Keen, PT    Note: If patient does not return for scheduled/ recommended follow up visits, this note will serve as a discharge from care along with most recent update on progress.

## 2022-05-27 ENCOUNTER — HOSPITAL ENCOUNTER (OUTPATIENT)
Dept: PHYSICAL THERAPY | Age: 67
Setting detail: THERAPIES SERIES
Discharge: HOME | End: 2022-05-27
Payer: COMMERCIAL

## 2022-05-27 PROCEDURE — 97140 MANUAL THERAPY 1/> REGIONS: CPT

## 2022-05-27 PROCEDURE — 97112 NEUROMUSCULAR REEDUCATION: CPT

## 2022-05-27 PROCEDURE — 97110 THERAPEUTIC EXERCISES: CPT

## 2022-05-27 NOTE — FLOWSHEET NOTE
3 [facility] and [facility], [address] [place] [address] [address]  Phone: [phone]   Fax:     [phone]      Physical Therapy Treatment Note/ Progress Report:           Date:  2022    Patient Name:  Jeanna Guardado    :  1955  MRN: [id number]  Restrictions/Precautions:    Medical/Treatment Diagnosis Information:  · Diagnosis: Left hip fracture Z87.81  · Treatment Diagnosis: Left hip pain, decreased ROm and strength  Insurance/Certification information:  PT Insurance Information: Workers comp, 12 visit thru   Physician Information[de-identified] Annamaria Martinez  Has the plan of care been signed (Y/N):        []  Yes  []  No     Date of Patient follow up with Physician:     Assessment Summary: Radha Juan is a 73 y.o. male reporting to OP PT s/p left hip ORIF on 22 which occurred after fall at work. Pt is noted to have reduced hip and knee ROM. Moderate reduction in hip flexion strength. Still ambulating with FWW at 50% WB. Is this a Progress Report:     []  Yes  [x]  No        If Yes:  Date Range for reporting period:  Beginning   22  Ending 22    Progress report will be due (10 Rx or 30 days whichever is less):        Recertification will be due (POC Duration  / 90 days whichever is less): 22          Visit # Insurance Allowable Auth Required   In Person  12 thru  []  Yes     []  No    [facility]   []  Yes     []  No    Total 9             Functional Scale: FOTO 37, FOTO 60    Date assessed:       Latex Allergy:  [x]NO      []YES  Preferred Language for Healthcare:   [x]English       []other:      Pain level:  0/10     SUBJECTIVE:  Pt states hip feels pretty good today. Gets a little sore with longer walks.        OBJECTIVE:     Knee flexion 120    Hip flexion 105      RESTRICTIONS/PRECAUTIONS: 50% WB    Exercises/Interventions: HEP code: [id number]  Therapeutic Ex (42634) HEP / 22   Warm-up Rec bike  10' 10', Lv 5 10', Lv 6          TABLE       Adduction isometric x x30 x30 x30   SL CS x X30, BTB x20 B, BTB --   SL Reverse CS  x30 x20 B x30   Bridge x x30 x30 x30   Figure 4 stretch    1'   Prone hip extension   x20 B --                               STANDING       Wedge gastroc stretch  1' 1' 1'   Heel raises on 1/2 roll  x30 x30 x30   Toe raises on 1/2 roll  x30 x30 x30   Leg Press DL  X20, 125# X20, 130# X20, 130#   Leg Press SL  X20, 85# X20 B, 85# x20 B, 90#   HS curl machine  X30, 60#  X30, 60#   Knee extension machine  X30, 60#  X30, 60#   Tandem standing  --  --   Anterior step ups  X20, 6\" X20, 6\" X20, 6\"   Lateral step ups  x15 B, 6\"  x20 B, 6\"   Anterior step down  --  --   Rotational step up   X20, 6\" --   BOSU balance  1' 1'    BOSU squats  x15 x15    BOSU marches   x30    Retrowalk CC  X15, 45#  X10, 55#   Gait training    2'    [facility]   x20 B, 45# x20 B, 45#   BITA Abduction   x20 B, 45# x20 B, 45#   BITA Flexion   x20 B, 45# x20 B, 45#                   Manual (25045): PROm to hip flexion, abduction, IR/ER and knee flexion, prone quad stretch 10'      Therapeutic Exercise and NMR EXR  [x] (27107) Provided verbal/tactile cueing for activities related to strengthening, flexibility, endurance, ROM for improvements in LE, proximal hip, and core control with self care, mobility, lifting, ambulation. [x] (20091) Provided verbal/tactile cueing for activities related to improving balance, coordination, kinesthetic sense, posture, motor skill, proprioception to assist with LE, proximal hip, and core control in self-care, mobility, lifting, ambulation and eccentric single leg control.      NMR and Therapeutic Activities:    [x] (00032 or 87239) Provided verbal/tactile cueing for activities related to improving balance, coordination, kinesthetic sense, posture, motor skill, proprioception and motor activation to allow for proper function of core, proximal hip and LE with self-care and ADLs and functional mobility.   [] (09664) Gait Re-education- Provided training and instruction to the patient for proper LE, core and proximal hip recruitment and positioning and eccentric body weight control with ambulation re-education including up and down stairs     Home Exercise Program:    [x] (62225) Reviewed/Progressed HEP activities related to strengthening, flexibility, endurance, ROM of core, proximal hip and LE for functional self-care, mobility, lifting and ambulation/stair navigation   [] (83268) Reviewed/Progressed HEP activities related to improving balance, coordination, kinesthetic sense, posture, motor skill, proprioception of core, proximal hip and LE for self-care, mobility, lifting, and ambulation/stair navigation      Manual Treatments:  PROM / STM / Oscillations-Mobs:  G-I, II, III, IV (PA's, Inf., Post.)  [x] (72144) Provided manual therapy to mobilize LE, proximal hip and/or LS spine soft tissue/joints for the purpose of modulating pain, promoting relaxation, increasing ROM, reducing/eliminating soft tissue swelling/inflammation/restriction, improving soft tissue extensibility and allowing for proper ROM for normal function with self-care, mobility, lifting and ambulation. Modalities:     [] GAME READY (VASO)- for significant edema, swelling, pain control. Charges:  Timed Code Treatment Minutes: 53   Total Treatment Minutes:  53   BWC:  TE TIME:  NMR TIME:  MANUAL TIME:   TA  UNTIMED MINUTES:  Medicare Total:   30  13  10            [] EVAL (LOW) 05264 (typically 20 minutes face-to-face)  [] EVAL (MOD) 04176 (typically 30 minutes face-to-face)  [] EVAL (HIGH) 96315 (typically 45 minutes face-to-face)  [] RE-EVAL     [x] MA(89869) 2     [] IONTO  [x] NMR (09235) 1     [] VASO  [x] Manual (77924) 1     [] Dry Needle:  [] TA x      [] Mech Traction (06909)  [] ES(attended) (26530)      [] ES (un) (17794):        GOALS:     Patient stated goal: Improve mobility, return to work.    Therapist goals for Patient:   Short Term Goals: To be achieved in: 2 weeks  1. Independent in HEP and progression per patient tolerance, in order to prevent re-injury. []? Progressing: [x]? Met: []? Not Met: []? Adjusted      2. Patient will have a decrease in pain of NRPS to <2/10 facilitate improvement in movement, function, and ADLs as indicated by Functional Deficits. []? Progressing: [x]? Met: []? Not Met: []? Adjusted      Long Term Goals: To be achieved in: 8 weeks  1. Pt will improve FOTO to 60 or more, indicating improved functional capacity. []? Progressing: [x]? Met: []? Not Met: []? Adjusted       2. Patient will demonstrate increased AROM to hip flex/knee flexion to 110/115 to allow for proper joint functioning as indicated by patients Functional Deficits. [x]? Progressing: []? Met: []? Not Met: []? Adjusted      3. Patient will demonstrate an increase in Strength to hip flexion  to 5/5 allow for proper functional mobility as indicated by patients Functional Deficits. []? Progressing: []? Met: []? Not Met: []? Adjusted      4. Patient will return to functional activities with NRPS of 0/10. []? Progressing: []? Met: []? Not Met: []? Adjusted      5. Pt will be able to ambulate stairs reciprocally (patient specific functional goal)    []? Progressing: []? Met: []? Not Met: []? Adjusted                ASSESSMENT:  Ruthy Query session well. Gait deviation remains but still improved. Addition of figure 4 stretch to help facilitate ease of putting on shoes and socks. Patient received education on their current pathology and how their condition effects them with their functional activities. Patient understood discussion and questions were answered. Patient understands their activity limitations and understands rational for treatment progression. Pt educated on plan of care and HEP, if worsening symptoms to d/c that exercise.            PLAN: See thaddeus  [x] Continue per plan of care [] Alter current plan (see comments above)  [] Plan of care initiated [] Hold pending MD visit [] Discharge      Electronically signed by:  Ailin Singh PT    Note: If patient does not return for scheduled/ recommended follow up visits, this note will serve as a discharge from care along with most recent update on progress.

## 2022-05-31 ENCOUNTER — HOSPITAL ENCOUNTER (OUTPATIENT)
Dept: PHYSICAL THERAPY | Age: 67
Setting detail: THERAPIES SERIES
Discharge: HOME | End: 2022-05-31
Payer: COMMERCIAL

## 2022-05-31 PROCEDURE — 97110 THERAPEUTIC EXERCISES: CPT

## 2022-05-31 PROCEDURE — 97140 MANUAL THERAPY 1/> REGIONS: CPT

## 2022-05-31 PROCEDURE — 97112 NEUROMUSCULAR REEDUCATION: CPT

## 2022-05-31 NOTE — FLOWSHEET NOTE
[address] [place] [address] and Sports Rehabilitation[address], [address] [place] [address] [address]  Phone: [phone]   Fax:     [phone]      Physical Therapy Treatment Note/ Progress Report:           Date:  2022    Patient Name:  Cassie Traylor    :  1955  MRN: [id number]  Restrictions/Precautions:    Medical/Treatment Diagnosis Information:   · Diagnosis: Left hip fracture Z87.81  · Treatment Diagnosis: Left hip pain, decreased ROm and strength  Insurance/Certification information:  PT Insurance Information: Workers comp, 12 visit thru   Physician Information[de-identified] Odilia Prasad  Has the plan of care been signed (Y/N):        []  Yes  []  No     Date of Patient follow up with Physician:     Assessment Summary: Kennedi Kwan is a 73 y.o. male reporting to OP PT s/p left hip ORIF on 22 which occurred after fall at work. Pt is noted to have reduced hip and knee ROM. Moderate reduction in hip flexion strength. Still ambulating with FWW at 50% WB. Is this a Progress Report:     []  Yes  [x]  No        If Yes:  Date Range for reporting period:  Beginning   22  Ending 22    Progress report will be due (10 Rx or 30 days whichever is less):        Recertification will be due (POC Duration  / 90 days whichever is less): 22          Visit # Insurance Allowable Auth Required   In Person  12 thru  []  Yes     []  No    Tele Health   []  Yes     []  No    Total 9             Functional Scale: FOTO 37, FOTO 60    Date assessed:       Latex Allergy:  [x]NO      []YES  Preferred Language for Healthcare:   [x]English       []other:      Pain level:  0/10     SUBJECTIVE:  Pt states hip is feeling good.      OBJECTIVE:     Knee flexion 120    Hip flexion 105      RESTRICTIONS/PRECAUTIONS: 50% WB     Exercises/Interventions: HEP code: [id number]  Therapeutic Ex () HEP / 22   Warm-up       Rec bike  10' 10', Lv 5 10', Lv 6          TABLE Adduction isometric x x30 x30 x30   SL CS x X30, BTB x20 B, BTB --   SL Reverse CS  x30 x20 B x30   Bridge x x30 x30 x30   Figure 4 stretch    1'   Prone hip extension   x20 B --                               STANDING       Wedge gastroc stretch  1' 1' 1'   Heel raises on 1/2 roll  x30 x30 x30   Toe raises on 1/2 roll  x30 x30 x30   Leg Press DL  X20, 125# X20, 130# X20, 140#   Leg Press SL  X20, 85# X20 B, 85# x20 B, 90#   HS curl machine  X30, 60#  X30, 60#   Knee extension machine  X30, 60#  X30, 60#   Tandem standing  --  --   Anterior step ups  X20, 6\" X20, 6\" X20, 6\"   Lateral step ups  x15 B, 6\"  x20 B, 6\"   Anterior step down  --  X20, 6\"   Rotational step up   X20, 6\" --   BOSU balance  1' 1'    BOSU squats  x15 x15    BOSU marches   x30    Retrowalk CC  X15, 45#  X10, 55#   Gait training    2'    [facility] Extension   x20 B, 45# x20 B, 60#   BITA Abduction   x20 B, 45# x20 B, 60#   BITA Flexion   x20 B, 45# x20 B, 45#   Resisted side steps    x7 laps, OTB            Manual (69515): PROm to hip flexion, abduction, IR/ER and knee flexion, prone quad stretch 10'      Therapeutic Exercise and NMR EXR  [x] (42535) Provided verbal/tactile cueing for activities related to strengthening, flexibility, endurance, ROM for improvements in LE, proximal hip, and core control with self care, mobility, lifting, ambulation. [x] (41738) Provided verbal/tactile cueing for activities related to improving balance, coordination, kinesthetic sense, posture, motor skill, proprioception to assist with LE, proximal hip, and core control in self-care, mobility, lifting, ambulation and eccentric single leg control.      NMR and Therapeutic Activities:    [x] (71153 or 18806) Provided verbal/tactile cueing for activities related to improving balance, coordination, kinesthetic sense, posture, motor skill, proprioception and motor activation to allow for proper function of core, proximal hip and LE with self-care and ADLs and functional mobility.   [] (98330) Gait Re-education- Provided training and instruction to the patient for proper LE, core and proximal hip recruitment and positioning and eccentric body weight control with ambulation re-education including up and down stairs     Home Exercise Program:    [x] (93099) Reviewed/Progressed HEP activities related to strengthening, flexibility, endurance, ROM of core, proximal hip and LE for functional self-care, mobility, lifting and ambulation/stair navigation   [] (20027) Reviewed/Progressed HEP activities related to improving balance, coordination, kinesthetic sense, posture, motor skill, proprioception of core, proximal hip and LE for self-care, mobility, lifting, and ambulation/stair navigation      Manual Treatments:  PROM / STM / Oscillations-Mobs:  G-I, II, III, IV (PA's, Inf., Post.)  [x] (80779) Provided manual therapy to mobilize LE, proximal hip and/or LS spine soft tissue/joints for the purpose of modulating pain, promoting relaxation, increasing ROM, reducing/eliminating soft tissue swelling/inflammation/restriction, improving soft tissue extensibility and allowing for proper ROM for normal function with self-care, mobility, lifting and ambulation. Modalities:     [] GAME READY (VASO)- for significant edema, swelling, pain control.        Charges:  Timed Code Treatment Minutes: 53   Total Treatment Minutes:  53   BWC:  TE TIME:  NMR TIME:  MANUAL TIME:   TA  UNTIMED MINUTES:  Medicare Total:   30  13  10            [] EVAL (LOW) 26160 (typically 20 minutes face-to-face)  [] EVAL (MOD) 18918 (typically 30 minutes face-to-face)  [] EVAL (HIGH) 25888 (typically 45 minutes face-to-face)  [] RE-EVAL     [x] HY(80603) 2     [] IONTO  [x] NMR (56216) 1     [] VASO  [x] Manual (54677) 1     [] Dry Needle:  [] TA x      [] Mech Traction (52004)  [] ES(attended) (81096)      [] ES (un) (79495):        GOALS:     Patient stated goal: Improve mobility, return to work.      Therapist goals for Patient:   Short Term Goals: To be achieved in: 2 weeks  1. Independent in HEP and progression per patient tolerance, in order to prevent re-injury. []? Progressing: [x]? Met: []? Not Met: []? Adjusted      2. Patient will have a decrease in pain of NRPS to <2/10 facilitate improvement in movement, function, and ADLs as indicated by Functional Deficits. []? Progressing: [x]? Met: []? Not Met: []? Adjusted      Long Term Goals: To be achieved in: 8 weeks  1. Pt will improve FOTO to 60 or more, indicating improved functional capacity. []? Progressing: [x]? Met: []? Not Met: []? Adjusted       2. Patient will demonstrate increased AROM to hip flex/knee flexion to 110/115 to allow for proper joint functioning as indicated by patients Functional Deficits. [x]? Progressing: []? Met: []? Not Met: []? Adjusted      3. Patient will demonstrate an increase in Strength to hip flexion  to 5/5 allow for proper functional mobility as indicated by patients Functional Deficits. []? Progressing: []? Met: []? Not Met: []? Adjusted      4. Patient will return to functional activities with NRPS of 0/10. []? Progressing: []? Met: []? Not Met: []? Adjusted      5. Pt will be able to ambulate stairs reciprocally (patient specific functional goal)    []? Progressing: []? Met: []? Not Met: []? Adjusted                ASSESSMENT:  Mer Reach session well. Patient received education on their current pathology and how their condition effects them with their functional activities. Patient understood discussion and questions were answered. Patient understands their activity limitations and understands rational for treatment progression. Pt educated on plan of care and HEP, if worsening symptoms to d/c that exercise.            PLAN: See eval  [x] Continue per plan of care [] Alter current plan (see comments above)  [] Plan of care initiated [] Hold pending MD visit [] Discharge      Electronically signed by:  Jahaira Badillo PT    Note: If patient does not return for scheduled/ recommended follow up visits, this note will serve as a discharge from care along with most recent update on progress.

## 2022-06-03 ENCOUNTER — HOSPITAL ENCOUNTER (OUTPATIENT)
Dept: PHYSICAL THERAPY | Age: 67
Setting detail: THERAPIES SERIES
Discharge: HOME | End: 2022-06-03
Payer: COMMERCIAL

## 2022-06-03 PROCEDURE — 97112 NEUROMUSCULAR REEDUCATION: CPT

## 2022-06-03 PROCEDURE — 97140 MANUAL THERAPY 1/> REGIONS: CPT

## 2022-06-03 PROCEDURE — 97110 THERAPEUTIC EXERCISES: CPT

## 2022-06-03 NOTE — FLOWSHEET NOTE
[address] [place] [address] and Sports Rehabilitation[address], [address] [place] [address] [address]  Phone: [phone]   Fax:     [phone]      Physical Therapy Treatment Note/ Progress Report:           Date:  6/3/2022    Patient Name:  Rachel Hennessy    :  1955  MRN: [id number]  Restrictions/Precautions:    Medical/Treatment Diagnosis Information:   · Diagnosis: Left hip fracture Z87.81  · Treatment Diagnosis: Left hip pain, decreased ROm and strength  Insurance/Certification information:  PT Insurance Information: Workers comp, 12 visit thru   Physician Information[de-identified] Ajith Delgado  Has the plan of care been signed (Y/N):        []  Yes  []  No     Date of Patient follow up with Physician:     Assessment Summary: Lona Martin is a 73 y.o. male reporting to OP PT s/p left hip ORIF on 22 which occurred after fall at work. Pt is noted to have reduced hip and knee ROM. Moderate reduction in hip flexion strength. Still ambulating with FWW at 50% WB. Is this a Progress Report:     []  Yes  [x]  No        If Yes:  Date Range for reporting period:  Beginning   22  Ending 22    Progress report will be due (10 Rx or 30 days whichever is less): 33       Recertification will be due (POC Duration  / 90 days whichever is less): 22          Visit # Insurance Allowable Auth Required   In Person  12 thru  []  Yes     []  No    [facility]   []  Yes     []  No    Total 10             Functional Scale: FOTO 37, FOTO 60    Date assessed:       Latex Allergy:  [x]NO      []YES  Preferred Language for Healthcare:   [x]English       []other:      Pain level:  0/10     SUBJECTIVE:  Pt states hip is feeling good.      OBJECTIVE:     Knee flexion 120    Hip flexion 105      RESTRICTIONS/PRECAUTIONS: 50% WB     Exercises/Interventions: HEP code: [id number]  Therapeutic Ex (28852) HEP 5/23/22 6/3/22    Warm-up       Rec bike  10', Lv 5 10', Lv 6           TABLE Adduction isometric x x30 x30    SL CS x x20 B, BTB --    SL Reverse CS  x20 B x30    Bridge x x30 x30    Figure 4 stretch   1'    Prone hip extension  x20 B --                                STANDING       Wedge gastroc stretch  1' 1'    Heel raises on 1/2 roll  x30 x30    Toe raises on 1/2 roll  x30 x30    Leg Press DL  X20, 130# X20, 140#    Leg Press SL  X20 B, 85# x20 B, 90#    HS curl machine   X30, 60#    Knee extension machine   X30, 60#    Tandem standing   --    Anterior step ups  X20, 6\" X20, 6\"    Lateral step ups   x20 B, 6\"    Anterior step down   X20, 6\"    Rotational step up  X20, 6\" --    BOSU balance  1' 1'    BOSU squats  x15 x15    BOSU marches  x30     Retrowalk CC   --    Gait training   2'     [facility] [place] Extension  x20 B, 45# x20 B, 60#    BITA Abduction  x20 B, 45# x20 B, 60#    BITA Flexion  x20 B, 45# x20 B, 45#    Resisted side steps   x7 laps, OTB    Sled   x7 laps      Manual (06084): PROm to hip flexion, abduction, IR/ER and knee flexion, prone quad stretch 10'      Therapeutic Exercise and NMR EXR  [x] (38083) Provided verbal/tactile cueing for activities related to strengthening, flexibility, endurance, ROM for improvements in LE, proximal hip, and core control with self care, mobility, lifting, ambulation. [x] (73164) Provided verbal/tactile cueing for activities related to improving balance, coordination, kinesthetic sense, posture, motor skill, proprioception to assist with LE, proximal hip, and core control in self-care, mobility, lifting, ambulation and eccentric single leg control.      NMR and Therapeutic Activities:    [x] (52385 or 01795) Provided verbal/tactile cueing for activities related to improving balance, coordination, kinesthetic sense, posture, motor skill, proprioception and motor activation to allow for proper function of core, proximal hip and LE with self-care and ADLs and functional mobility.   [] (66994) Gait Re-education- Provided training and instruction to the patient for proper LE, core and proximal hip recruitment and positioning and eccentric body weight control with ambulation re-education including up and down stairs     Home Exercise Program:    [x] (13542) Reviewed/Progressed HEP activities related to strengthening, flexibility, endurance, ROM of core, proximal hip and LE for functional self-care, mobility, lifting and ambulation/stair navigation   [] (22880) Reviewed/Progressed HEP activities related to improving balance, coordination, kinesthetic sense, posture, motor skill, proprioception of core, proximal hip and LE for self-care, mobility, lifting, and ambulation/stair navigation      Manual Treatments:  PROM / STM / Oscillations-Mobs:  G-I, II, III, IV (PA's, Inf., Post.)  [x] (40110) Provided manual therapy to mobilize LE, proximal hip and/or LS spine soft tissue/joints for the purpose of modulating pain, promoting relaxation, increasing ROM, reducing/eliminating soft tissue swelling/inflammation/restriction, improving soft tissue extensibility and allowing for proper ROM for normal function with self-care, mobility, lifting and ambulation. Modalities:     [] GAME READY (VASO)- for significant edema, swelling, pain control. Charges:  Timed Code Treatment Minutes: 53   Total Treatment Minutes:  53   BWC:  TE TIME:  NMR TIME:  MANUAL TIME:   TA  UNTIMED MINUTES:  Medicare Total:   30  13  10            [] EVAL (LOW) 98283 (typically 20 minutes face-to-face)  [] EVAL (MOD) 39931 (typically 30 minutes face-to-face)  [] EVAL (HIGH) 54644 (typically 45 minutes face-to-face)  [] RE-EVAL     [x] OQ(49968) 2     [] IONTO  [x] NMR (64558) 1     [] VASO  [x] Manual (16143) 1     [] Dry Needle:  [] TA x      [] Mech Traction (63065)  [] ES(attended) (88902)      [] ES (un) (32786):        GOALS:     Patient stated goal: Improve mobility, return to work.      Therapist goals for Patient:   Short Term Goals: To be achieved in: 2 weeks  1.  Independent in HEP and progression per patient tolerance, in order to prevent re-injury. []? Progressing: [x]? Met: []? Not Met: []? Adjusted      2. Patient will have a decrease in pain of NRPS to <2/10 facilitate improvement in movement, function, and ADLs as indicated by Functional Deficits. []? Progressing: [x]? Met: []? Not Met: []? Adjusted      Long Term Goals: To be achieved in: 8 weeks  1. Pt will improve FOTO to 60 or more, indicating improved functional capacity. []? Progressing: [x]? Met: []? Not Met: []? Adjusted       2. Patient will demonstrate increased AROM to hip flex/knee flexion to 110/115 to allow for proper joint functioning as indicated by patients Functional Deficits. [x]? Progressing: []? Met: []? Not Met: []? Adjusted      3. Patient will demonstrate an increase in Strength to hip flexion  to 5/5 allow for proper functional mobility as indicated by patients Functional Deficits. []? Progressing: []? Met: []? Not Met: []? Adjusted      4. Patient will return to functional activities with NRPS of 0/10. []? Progressing: []? Met: []? Not Met: []? Adjusted      5. Pt will be able to ambulate stairs reciprocally (patient specific functional goal)    []? Progressing: []? Met: []? Not Met: []? Adjusted                ASSESSMENT:  Beka Verma session well. Addition of more pushing pulling with sled today to help replicate some work activities. Patient received education on their current pathology and how their condition effects them with their functional activities. Patient understood discussion and questions were answered. Patient understands their activity limitations and understands rational for treatment progression. Pt educated on plan of care and HEP, if worsening symptoms to d/c that exercise.            PLAN: See eval  [x] Continue per plan of care [] Alter current plan (see comments above)  [] Plan of care initiated [] Hold pending MD visit [] Discharge      Electronically signed by:  Vikas Rueda Ye Solis, PT    Note: If patient does not return for scheduled/ recommended follow up visits, this note will serve as a discharge from care along with most recent update on progress.

## 2022-06-07 ENCOUNTER — HOSPITAL ENCOUNTER (OUTPATIENT)
Dept: PHYSICAL THERAPY | Age: 67
Setting detail: THERAPIES SERIES
Discharge: HOME | End: 2022-06-07
Payer: COMMERCIAL

## 2022-06-07 PROCEDURE — 97112 NEUROMUSCULAR REEDUCATION: CPT

## 2022-06-07 PROCEDURE — 97140 MANUAL THERAPY 1/> REGIONS: CPT

## 2022-06-07 PROCEDURE — 97110 THERAPEUTIC EXERCISES: CPT

## 2022-06-10 ENCOUNTER — HOSPITAL ENCOUNTER (OUTPATIENT)
Dept: PHYSICAL THERAPY | Age: 67
Setting detail: THERAPIES SERIES
Discharge: HOME | End: 2022-06-10
Payer: COMMERCIAL

## 2022-06-10 PROCEDURE — 97112 NEUROMUSCULAR REEDUCATION: CPT

## 2022-06-10 PROCEDURE — 97110 THERAPEUTIC EXERCISES: CPT

## 2022-06-10 PROCEDURE — 97140 MANUAL THERAPY 1/> REGIONS: CPT

## 2022-06-10 NOTE — FLOWSHEET NOTE
[address] [place] [address] [facility], [address] [place] [address] [address]  Phone: [phone]   Fax:     [phone]      Physical Therapy Treatment Note/ Progress Report:           Date:  6/10/2022    Patient Name:  Tanja Bauer    :  1955  MRN: [id number]  Restrictions/Precautions:    Medical/Treatment Diagnosis Information:   · Diagnosis: Left hip fracture Z87.81  · Treatment Diagnosis: Left hip pain, decreased ROm and strength  Insurance/Certification information:  PT Insurance Information: Workers comp, 12 visit thru   Physician Information[de-identified] Lennox Duhamel  Has the plan of care been signed (Y/N):        []  Yes  []  No     Date of Patient follow up with Physician:     Assessment Summary: Malick Dumas is a 73 y.o. male reporting to OP PT s/p left hip ORIF on 22 which occurred after fall at work. Pt is noted to have reduced hip and knee ROM. Moderate reduction in hip flexion strength. Still ambulating with FWW at 50% WB. Is this a Progress Report:     []  Yes  [x]  No        If Yes:  Date Range for reporting period:  Beginning   22  Ending 22    Progress report will be due (10 Rx or 30 days whichever is less):        Recertification will be due (POC Duration  / 90 days whichever is less): 22          Visit # Insurance Allowable Auth Required   In Person  12 thru   12 - []  Yes     []  No    Tele Health   []  Yes     []  No    Total 13             Functional Scale: FOTO 37, FOTO 60    Date assessed:       Latex Allergy:  [x]NO      []YES  Preferred Language for Healthcare:   [x]English       []other:      Pain level:  0/10     SUBJECTIVE:  Pt states hip is feeling good.      OBJECTIVE:     Knee flexion 120    Hip flexion 105      RESTRICTIONS/PRECAUTIONS: 50% WB     Exercises/Interventions: HEP code: [id number]  Therapeutic Ex (43659) HEP 5/23/22 6/3/22 6/10/22   Warm-up       Rec bike  10', Lv 5 10', Lv 6 10', Lv 6          TABLE       Adduction isometric x x30 x30 x30   SL CS x x20 B, BTB -- x20   SL Reverse CS  x20 B x30    Bridge x x30 x30 x30   Figure 4 stretch   1'    Prone hip extension  x20 B --    Glenn stretch    1'x2                        STANDING       Wedge gastroc stretch  1' 1' 1'   Heel raises on 1/2 roll  x30 x30 x30   Toe raises on 1/2 roll  x30 x30 x30   Leg Press DL  X20, 130# X20, 140# X20, 145#   Leg Press SL  X20 B, 85# x20 B, 90# X20, 85#   HS curl machine   X30, 60#    Knee extension machine   X30, 60#    Tandem standing   --    Anterior step ups  X20, 6\" X20, 6\" X20, 6\"   Lateral step ups   x20 B, 6\"    Anterior step down   X20, 6\" X20, 6\"   Rotational step up  X20, 6\" -- X20, 6\"   BOSU balance  1' 1' 1'   BOSU squats  x15 x15 x15   BOSU marches  x30     BOSU step ups    x20 B   Retrowalk CC   -- X10, 65#   Gait training   2'     [facility] Extension  x20 B, 45# x20 B, 60# x20 B, 60#   BITA Abduction  x20 B, 45# x20 B, 60# X20 B, 60#   BITA Flexion  x20 B, 45# x20 B, 45# x20 B, 45#   Resisted side steps   x7 laps, OTB    Sled   x7 laps --     Manual ([id number]): PROm to hip flexion, abduction, IR/ER and knee flexion, prone quad stretch 10'      Therapeutic Exercise and NMR EXR  [x] (37941) Provided verbal/tactile cueing for activities related to strengthening, flexibility, endurance, ROM for improvements in LE, proximal hip, and core control with self care, mobility, lifting, ambulation. [x] (27677) Provided verbal/tactile cueing for activities related to improving balance, coordination, kinesthetic sense, posture, motor skill, proprioception to assist with LE, proximal hip, and core control in self-care, mobility, lifting, ambulation and eccentric single leg control.      NMR and Therapeutic Activities:    [x] (60024 or 13519) Provided verbal/tactile cueing for activities related to improving balance, coordination, kinesthetic sense, posture, motor skill, proprioception and motor activation to allow for proper function of core, proximal hip and LE with self-care and ADLs and functional mobility.   [] (27259) Gait Re-education- Provided training and instruction to the patient for proper LE, core and proximal hip recruitment and positioning and eccentric body weight control with ambulation re-education including up and down stairs     Home Exercise Program:    [x] (56726) Reviewed/Progressed HEP activities related to strengthening, flexibility, endurance, ROM of core, proximal hip and LE for functional self-care, mobility, lifting and ambulation/stair navigation   [] (34757) Reviewed/Progressed HEP activities related to improving balance, coordination, kinesthetic sense, posture, motor skill, proprioception of core, proximal hip and LE for self-care, mobility, lifting, and ambulation/stair navigation      Manual Treatments:  PROM / STM / Oscillations-Mobs:  G-I, II, III, IV (PA's, Inf., Post.)  [x] (16431) Provided manual therapy to mobilize LE, proximal hip and/or LS spine soft tissue/joints for the purpose of modulating pain, promoting relaxation, increasing ROM, reducing/eliminating soft tissue swelling/inflammation/restriction, improving soft tissue extensibility and allowing for proper ROM for normal function with self-care, mobility, lifting and ambulation. Modalities:     [] GAME READY (VASO)- for significant edema, swelling, pain control.      Time in: 740    Time out: 835      Charges:  Timed Code Treatment Minutes: 55   Total Treatment Minutes:  55   BWC:  TE TIME:  NMR TIME:  MANUAL TIME:   TA  UNTIMED MINUTES:  Medicare Total:   32  13  10            [] EVAL (LOW) 29977 (typically 20 minutes face-to-face)  [] EVAL (MOD) 11512 (typically 30 minutes face-to-face)  [] EVAL (HIGH) 84430 (typically 45 minutes face-to-face)  [] RE-EVAL     [x] ZM(34158) 2     [] IONTO  [x] NMR (01915) 1     [] VASO  [x] Manual (84292) 1     [] Dry Needle:  [] TA x      [] Mech Traction (07104)  [] ES(attended) (66725) [] ES (un) (52853):        GOALS:     Patient stated goal: Improve mobility, return to work.      Therapist goals for Patient:   Short Term Goals: To be achieved in: 2 weeks  1. Independent in HEP and progression per patient tolerance, in order to prevent re-injury. []? Progressing: [x]? Met: []? Not Met: []? Adjusted      2. Patient will have a decrease in pain of NRPS to <2/10 facilitate improvement in movement, function, and ADLs as indicated by Functional Deficits. []? Progressing: [x]? Met: []? Not Met: []? Adjusted      Long Term Goals: To be achieved in: 8 weeks  1. Pt will improve FOTO to 60 or more, indicating improved functional capacity. []? Progressing: [x]? Met: []? Not Met: []? Adjusted       2. Patient will demonstrate increased AROM to hip flex/knee flexion to 110/115 to allow for proper joint functioning as indicated by patients Functional Deficits. [x]? Progressing: []? Met: []? Not Met: []? Adjusted      3. Patient will demonstrate an increase in Strength to hip flexion  to 5/5 allow for proper functional mobility as indicated by patients Functional Deficits. []? Progressing: []? Met: []? Not Met: []? Adjusted      4. Patient will return to functional activities with NRPS of 0/10. []? Progressing: []? Met: []? Not Met: []? Adjusted      5. Pt will be able to ambulate stairs reciprocally (patient specific functional goal)    []? Progressing: []? Met: []? Not Met: []? Adjusted                ASSESSMENT:  Natalie Boots session well. Patient received education on their current pathology and how their condition effects them with their functional activities. Patient understood discussion and questions were answered. Patient understands their activity limitations and understands rational for treatment progression. Pt educated on plan of care and HEP, if worsening symptoms to d/c that exercise.            PLAN: See eval  [x] Continue per plan of care [] Alter current plan (see comments above)  [] Plan of care initiated [] Hold pending MD visit [] Discharge      Electronically signed by:  Connie Keen PT    Note: If patient does not return for scheduled/ recommended follow up visits, this note will serve as a discharge from care along with most recent update on progress.

## 2022-06-13 ENCOUNTER — HOSPITAL ENCOUNTER (OUTPATIENT)
Dept: PHYSICAL THERAPY | Age: 67
Setting detail: THERAPIES SERIES
Discharge: HOME | End: 2022-06-13
Payer: COMMERCIAL

## 2022-06-13 PROCEDURE — 97110 THERAPEUTIC EXERCISES: CPT

## 2022-06-13 PROCEDURE — 97140 MANUAL THERAPY 1/> REGIONS: CPT

## 2022-06-13 PROCEDURE — 97112 NEUROMUSCULAR REEDUCATION: CPT

## 2022-06-13 NOTE — FLOWSHEET NOTE
[address] [facility], [address] [place] [address] [address]  Phone: [phone]   Fax:     [phone]      Physical Therapy Treatment Note/ Progress Report:           Date:  2022    Patient Name:  Ander Hale    :  1955  MRN: [id number]  Restrictions/Precautions:    Medical/Treatment Diagnosis Information:   · Diagnosis: Left hip fracture Z87.81  · Treatment Diagnosis: Left hip pain, decreased ROm and strength  Insurance/Certification information:  PT Insurance Information: Workers comp, 12 visit thru   Physician Information[de-identified] Isabel Hunt  Has the plan of care been signed (Y/N):        []  Yes  []  No     Date of Patient follow up with Physician:     Assessment Summary: Abhilash Padilla is a 73 y.o. male reporting to OP PT s/p left hip ORIF on 22 which occurred after fall at work. Pt is noted to have reduced hip and knee ROM. Moderate reduction in hip flexion strength. Still ambulating with FWW at 50% WB. Is this a Progress Report:     []  Yes  [x]  No        If Yes:  Date Range for reporting period:  Beginning   22  Ending 22    Progress report will be due (10 Rx or 30 days whichever is less):        Recertification will be due (POC Duration  / 90 days whichever is less): 22          Visit # Insurance Allowable Auth Required   In Person  12 thru   12 - []  Yes     []  No    Tele Health   []  Yes     []  No    Total 14             Functional Scale: FOTO 37, FOTO 60    Date assessed:       Latex Allergy:  [x]NO      []YES  Preferred Language for Healthcare:   [x]English       []other:      Pain level:  0/10     SUBJECTIVE:  Pt states hip feels good.      OBJECTIVE:     Knee flexion 120    Hip flexion 105      RESTRICTIONS/PRECAUTIONS: 50% WB     Exercises/Interventions: HEP code: [id number]  Therapeutic Ex (25887) HEP 5/23/22 6/3/22 6/13/22    Warm-up        Rec bike  10', Lv 5 10', Lv 6 10', Lv 6            TABLE        Adduction isometric x x30 x30 x30    SL CS x x20 B, BTB -- x20    SL Reverse CS  x20 B x30     Bridge x x30 x30 x30    Figure 4 stretch   1'     Prone hip extension  x20 B --     Glenn stretch    1'x2                            STANDING        Wedge gastroc stretch  1' 1' 1'    Heel raises on 1/2 roll  x30 x30 x30    Toe raises on 1/2 roll  x30 x30 x30    Leg Press DL  X20, 130# X20, 140# X20, 145#    Leg Press SL  X20 B, 85# x20 B, 90# X20, 85#    HS curl machine   X30, 60#     Knee extension machine   X30, 60#     Tandem standing   --     Anterior step ups  X20, 6\" X20, 6\" X20, 6\"    Lateral step ups   x20 B, 6\"     Anterior step down   X20, 6\" X20, 6\"    Rotational step up  X20, 6\" -- X20, 6\"    BOSU balance  1' 1' 1'    BOSU squats  x15 x15 x15    BOSU marches  x30      BOSU step ups    x20 B    Retrowalk CC   -- --    Gait training - long strides  2'  40'x8    [facility] [place] Extension  x20 B, 45# x20 B, 60# x20 B, 60#    BITA Abduction  x20 B, 45# x20 B, 60# X20 B, 60#    BITA Flexion  x20 B, 45# x20 B, 45# x20 B, 45#    Resisted side steps   x7 laps, OTB     Sled   x7 laps x10 laps      Manual (36893): PROm to hip flexion, abduction, IR/ER and knee flexion, prone quad stretch 10'      Therapeutic Exercise and NMR EXR  [x] (86791) Provided verbal/tactile cueing for activities related to strengthening, flexibility, endurance, ROM for improvements in LE, proximal hip, and core control with self care, mobility, lifting, ambulation. [x] (69120) Provided verbal/tactile cueing for activities related to improving balance, coordination, kinesthetic sense, posture, motor skill, proprioception to assist with LE, proximal hip, and core control in self-care, mobility, lifting, ambulation and eccentric single leg control.      NMR and Therapeutic Activities:    [x] (00361 or 31916) Provided verbal/tactile cueing for activities related to improving balance, coordination, kinesthetic sense, posture, motor skill, proprioception and motor activation to allow for proper function of core, proximal hip and LE with self-care and ADLs and functional mobility.   [] (41266) Gait Re-education- Provided training and instruction to the patient for proper LE, core and proximal hip recruitment and positioning and eccentric body weight control with ambulation re-education including up and down stairs     Home Exercise Program:    [x] (29253) Reviewed/Progressed HEP activities related to strengthening, flexibility, endurance, ROM of core, proximal hip and LE for functional self-care, mobility, lifting and ambulation/stair navigation   [] (17635) Reviewed/Progressed HEP activities related to improving balance, coordination, kinesthetic sense, posture, motor skill, proprioception of core, proximal hip and LE for self-care, mobility, lifting, and ambulation/stair navigation      Manual Treatments:  PROM / STM / Oscillations-Mobs:  G-I, II, III, IV (PA's, Inf., Post.)  [x] (42690) Provided manual therapy to mobilize LE, proximal hip and/or LS spine soft tissue/joints for the purpose of modulating pain, promoting relaxation, increasing ROM, reducing/eliminating soft tissue swelling/inflammation/restriction, improving soft tissue extensibility and allowing for proper ROM for normal function with self-care, mobility, lifting and ambulation. Modalities:     [] GAME READY (VASO)- for significant edema, swelling, pain control.      Time in: 917    Time out: 1017      Charges:  Timed Code Treatment Minutes: 60   Total Treatment Minutes:  60   BWC:  TE TIME:  NMR TIME:  MANUAL TIME:   TA  UNTIMED MINUTES:  Medicare Total:   35  15  10            [] EVAL (LOW) 03668 (typically 20 minutes face-to-face)  [] EVAL (MOD) 69908 (typically 30 minutes face-to-face)  [] EVAL (HIGH) 14215 (typically 45 minutes face-to-face)  [] RE-EVAL     [x] XX(21095) 2     [] IONTO  [x] NMR (74179) 1     [] VASO  [x] Manual (39548) 1     [] Dry Needle:  [] TA x      [] [facility] Traction (51799)  [] ES(attended) (14714)      [] ES (un) (85970):        GOALS:     Patient stated goal: Improve mobility, return to work.      Therapist goals for Patient:   Short Term Goals: To be achieved in: 2 weeks  1. Independent in HEP and progression per patient tolerance, in order to prevent re-injury. []? Progressing: [x]? Met: []? Not Met: []? Adjusted      2. Patient will have a decrease in pain of NRPS to <2/10 facilitate improvement in movement, function, and ADLs as indicated by Functional Deficits. []? Progressing: [x]? Met: []? Not Met: []? Adjusted      Long Term Goals: To be achieved in: 8 weeks  1. Pt will improve FOTO to 60 or more, indicating improved functional capacity. []? Progressing: [x]? Met: []? Not Met: []? Adjusted       2. Patient will demonstrate increased AROM to hip flex/knee flexion to 110/115 to allow for proper joint functioning as indicated by patients Functional Deficits. [x]? Progressing: []? Met: []? Not Met: []? Adjusted      3. Patient will demonstrate an increase in Strength to hip flexion  to 5/5 allow for proper functional mobility as indicated by patients Functional Deficits. []? Progressing: []? Met: []? Not Met: []? Adjusted      4. Patient will return to functional activities with NRPS of 0/10. []? Progressing: []? Met: []? Not Met: []? Adjusted      5. Pt will be able to ambulate stairs reciprocally (patient specific functional goal)    []? Progressing: []? Met: []? Not Met: []? Adjusted                ASSESSMENT:  Maine Mandel session well. Worked on increasing stride length today. Patient received education on their current pathology and how their condition effects them with their functional activities. Patient understood discussion and questions were answered. Patient understands their activity limitations and understands rational for treatment progression.   Pt educated on plan of care and HEP, if worsening symptoms to d/c that exercise. PLAN: See eval  [x] Continue per plan of care [] Alter current plan (see comments above)  [] Plan of care initiated [] Hold pending MD visit [] Discharge      Electronically signed by:  Brenton Hodgkin, PT    Note: If patient does not return for scheduled/ recommended follow up visits, this note will serve as a discharge from care along with most recent update on progress.

## 2022-06-15 ENCOUNTER — HOSPITAL ENCOUNTER (OUTPATIENT)
Dept: PHYSICAL THERAPY | Age: 67
Setting detail: THERAPIES SERIES
Discharge: HOME | End: 2022-06-15
Payer: COMMERCIAL

## 2022-06-15 PROCEDURE — 97110 THERAPEUTIC EXERCISES: CPT | Performed by: SPECIALIST/TECHNOLOGIST

## 2022-06-15 PROCEDURE — 97140 MANUAL THERAPY 1/> REGIONS: CPT | Performed by: SPECIALIST/TECHNOLOGIST

## 2022-06-15 PROCEDURE — 97112 NEUROMUSCULAR REEDUCATION: CPT | Performed by: SPECIALIST/TECHNOLOGIST

## 2022-06-15 NOTE — PROGRESS NOTES
[address] [facility], [address] [place] [address] [address]  Phone: [phone]   Fax:     [phone]      Physical Therapy Treatment Note/ Progress Report:           Date:  6/15/2022    Patient Name:  Joss Hough    :  1955  MRN: [id number]  Restrictions/Precautions:    Medical/Treatment Diagnosis Information:   · Diagnosis: Left hip fracture Z87.81  · Treatment Diagnosis: Left hip pain, decreased ROm and strength  Insurance/Certification information:  PT Insurance Information: Workers comp, 12 visit thru   Physician Information[de-identified] Della Walker  Has the plan of care been signed (Y/N):        []  Yes  []  No     Date of Patient follow up with Physician:     Assessment Summary: Haley Du is a 73 y.o. male reporting to OP PT s/p left hip ORIF on 22 which occurred after fall at work. Pt is noted to have reduced hip and knee ROM. Moderate reduction in hip flexion strength. Still ambulating with FWW at 50% WB. Is this a Progress Report:     []  Yes  [x]  No        If Yes:  Date Range for reporting period:  Beginning   22  Ending 22    Progress report will be due (10 Rx or 30 days whichever is less):        Recertification will be due (POC Duration  / 90 days whichever is less): 22          Visit # Insurance Allowable Auth Required   In Person  12 thru   12 - []  Yes     []  No    Tele Health   []  Yes     []  No    Total 15 3/12            Functional Scale: FOTO 37, FOTO 60    Date assessed:       Latex Allergy:  [x]NO      []YES  Preferred Language for Healthcare:   [x]English       []other:      Pain level:  0/10     SUBJECTIVE:  Pt notes his hip is feeling pretty good.      OBJECTIVE:     Knee flexion 120    Hip flexion 105      RESTRICTIONS/PRECAUTIONS: 50% WB     Exercises/Interventions: HEP code: [id number]  Therapeutic Ex (13450) HEP 5/23/22 6/3/22 6/13/22 6/15/22   Warm-up        Rec bike 10', Lv 5 10', Lv 6 10', Lv 6 10' lvl 6           TABLE        Adduction isometric x x30 x30 x30 10\" x 10   SL CS x x20 B, BTB -- x20 x30   SL Reverse CS  x20 B x30     Bridge x x30 x30 x30 x30   Figure 4 stretch   1'     Prone hip extension  x20 B --     Glenn stretch    1'x2 1' x 2                           STANDING        Wedge gastroc stretch  1' 1' 1' 1'   Heel raises on 1/2 roll  x30 x30 x30 x30   Toe raises on 1/2 roll  x30 x30 x30 x30   Leg Press DL  X20, 130# X20, 140# X20, 145# x20 145#   Leg Press SL  X20 B, 85# x20 B, 90# X20, 85# x20 85#   HS curl machine   X30, 60#     Knee extension machine   X30, 60#     Tandem standing   --     Anterior step ups  X20, 6\" X20, 6\" X20, 6\" x20 6\"   Lateral step ups   x20 B, 6\"     Anterior step down   X20, 6\" X20, 6\" x20 6\"   Rotational step up  X20, 6\" -- X20, 6\" x20 6\"   BOSU balance  1' 1' 1' 1'   BOSU squats  x15 x15 x15 x15   BOSU marches  x30      BOSU step ups    x20 B X 20 B   Retrowalk CC   -- --    Gait training - long strides  2'  40'x8    [facility]  x20 B, 45# x20 B, 60# x20 B, 60# x20 B 60#   BITA Abduction  x20 B, 45# x20 B, 60# X20 B, 60# x20 B 60#   BITA Flexion  x20 B, 45# x20 B, 45# x20 B, 45# x20 B 45#   Resisted side steps   x7 laps, OTB     Sled   x7 laps x10 laps x8 laps     Manual (69454): PROm to hip flexion, abduction, IR/ER and knee flexion, prone quad stretch 10'      Therapeutic Exercise and NMR EXR  [x] (12751) Provided verbal/tactile cueing for activities related to strengthening, flexibility, endurance, ROM for improvements in LE, proximal hip, and core control with self care, mobility, lifting, ambulation. [x] (52023) Provided verbal/tactile cueing for activities related to improving balance, coordination, kinesthetic sense, posture, motor skill, proprioception to assist with LE, proximal hip, and core control in self-care, mobility, lifting, ambulation and eccentric single leg control.      NMR and Therapeutic Activities:    [x] (16886 or ) Provided verbal/tactile cueing for activities related to improving balance, coordination, kinesthetic sense, posture, motor skill, proprioception and motor activation to allow for proper function of core, proximal hip and LE with self-care and ADLs and functional mobility.   [] (86519) Gait Re-education- Provided training and instruction to the patient for proper LE, core and proximal hip recruitment and positioning and eccentric body weight control with ambulation re-education including up and down stairs     Home Exercise Program:    [x] (24308) Reviewed/Progressed HEP activities related to strengthening, flexibility, endurance, ROM of core, proximal hip and LE for functional self-care, mobility, lifting and ambulation/stair navigation   [] (95385) Reviewed/Progressed HEP activities related to improving balance, coordination, kinesthetic sense, posture, motor skill, proprioception of core, proximal hip and LE for self-care, mobility, lifting, and ambulation/stair navigation      Manual Treatments:  PROM / STM / Oscillations-Mobs:  G-I, II, III, IV (PA's, Inf., Post.)  [x] (16657) Provided manual therapy to mobilize LE, proximal hip and/or LS spine soft tissue/joints for the purpose of modulating pain, promoting relaxation, increasing ROM, reducing/eliminating soft tissue swelling/inflammation/restriction, improving soft tissue extensibility and allowing for proper ROM for normal function with self-care, mobility, lifting and ambulation. Modalities:     [] GAME READY (VASO)- for significant edema, swelling, pain control.      Time in: 817    Time out: 917      Charges:  Timed Code Treatment Minutes: 60   Total Treatment Minutes:  60   BWC:  TE TIME:  NMR TIME:  MANUAL TIME:   TA  UNTIMED MINUTES:  Medicare Total:   35  15  10            [] EVAL (LOW) 72212 (typically 20 minutes face-to-face)  [] EVAL (MOD) 55679 (typically 30 minutes face-to-face)  [] EVAL (HIGH) 74727 (typically 45 minutes face-to-face)  [] RE-EVAL     [x] VZ(87641) 2     [] IONTO  [x] NMR (84457) 1     [] VASO  [x] Manual (79120) 1     [] Dry Needle:  [] TA x      [] Mech Traction (24864)  [] ES(attended) (03887)      [] ES (un) (92740):        GOALS:     Patient stated goal: Improve mobility, return to work.      Therapist goals for Patient:   Short Term Goals: To be achieved in: 2 weeks  1. Independent in HEP and progression per patient tolerance, in order to prevent re-injury. []? Progressing: [x]? Met: []? Not Met: []? Adjusted      2. Patient will have a decrease in pain of NRPS to <2/10 facilitate improvement in movement, function, and ADLs as indicated by Functional Deficits. []? Progressing: [x]? Met: []? Not Met: []? Adjusted      Long Term Goals: To be achieved in: 8 weeks  1. Pt will improve FOTO to 60 or more, indicating improved functional capacity. []? Progressing: [x]? Met: []? Not Met: []? Adjusted       2. Patient will demonstrate increased AROM to hip flex/knee flexion to 110/115 to allow for proper joint functioning as indicated by patients Functional Deficits. [x]? Progressing: []? Met: []? Not Met: []? Adjusted      3. Patient will demonstrate an increase in Strength to hip flexion  to 5/5 allow for proper functional mobility as indicated by patients Functional Deficits. []? Progressing: []? Met: []? Not Met: []? Adjusted      4. Patient will return to functional activities with NRPS of 0/10. []? Progressing: []? Met: []? Not Met: []? Adjusted      5. Pt will be able to ambulate stairs reciprocally (patient specific functional goal)    []? Progressing: []? Met: []? Not Met: []? Adjusted                ASSESSMENT:  Antonette Sepulveda session well. Worked on increasing stride length today. Patient received education on their current pathology and how their condition effects them with their functional activities. Patient understood discussion and questions were answered.  Patient understands their activity limitations and understands rational for treatment progression. Pt educated on plan of care and HEP, if worsening symptoms to d/c that exercise. PLAN: See eval  [x] Continue per plan of care [] Alter current plan (see comments above)  [] Plan of care initiated [] Hold pending MD visit [] Discharge      Electronically signed by:  Sharron Franco PTA    Note: If patient does not return for scheduled/ recommended follow up visits, this note will serve as a discharge from care along with most recent update on progress.

## 2022-06-21 ENCOUNTER — HOSPITAL ENCOUNTER (OUTPATIENT)
Dept: PHYSICAL THERAPY | Age: 67
Setting detail: THERAPIES SERIES
Discharge: HOME | End: 2022-06-21
Payer: COMMERCIAL

## 2022-06-21 PROCEDURE — 97140 MANUAL THERAPY 1/> REGIONS: CPT | Performed by: SPECIALIST/TECHNOLOGIST

## 2022-06-21 PROCEDURE — 97110 THERAPEUTIC EXERCISES: CPT | Performed by: SPECIALIST/TECHNOLOGIST

## 2022-06-21 PROCEDURE — 97112 NEUROMUSCULAR REEDUCATION: CPT | Performed by: SPECIALIST/TECHNOLOGIST

## 2022-06-21 NOTE — PROGRESS NOTES
[address] [facility], [address] [place] [address] [address]  Phone: [phone]   Fax:     [phone]      Physical Therapy Treatment Note/ Progress Report:           Date:  2022    Patient Name:  Cassie Traylor    :  1955  MRN: [id number]  Restrictions/Precautions:    Medical/Treatment Diagnosis Information:   · Diagnosis: Left hip fracture Z87.81  · Treatment Diagnosis: Left hip pain, decreased ROm and strength  Insurance/Certification information:  PT Insurance Information: Workers comp, 12 visit thru   Physician Information[de-identified] Odilia Prasad  Has the plan of care been signed (Y/N):        []  Yes  []  No     Date of Patient follow up with Physician:     Assessment Summary: Kennedi Kwan is a 73 y.o. male reporting to OP PT s/p left hip ORIF on 22 which occurred after fall at work. Pt is noted to have reduced hip and knee ROM. Moderate reduction in hip flexion strength. Still ambulating with FWW at 50% WB. Is this a Progress Report:     []  Yes  [x]  No        If Yes:  Date Range for reporting period:  Beginning   22  Ending 22    Progress report will be due (10 Rx or 30 days whichever is less): 93       Recertification will be due (POC Duration  / 90 days whichever is less): 22          Visit # Insurance Allowable Auth Required   In Person  12 thru   12 - []  Yes     []  No    Tele Health   []  Yes     []  No    Total 16             Functional Scale: FOTO 37, FOTO 60    Date assessed:       Latex Allergy:  [x]NO      []YES  Preferred Language for Healthcare:   [x]English       []other:      Pain level:  0/10     SUBJECTIVE:  Pt notes his hip is doing well. He uses his cane only when he feels he needs it.    OBJECTIVE:     Knee flexion 120    Hip flexion 105      RESTRICTIONS/PRECAUTIONS: 50% WB     Exercises/Interventions: HEP code: [id number]  Therapeutic Ex (51870) HEP 5/23/22 6/3/22 6/13/22 6/15/22 & 6/21/22    Warm-up         Rec bike  10', Lv 5 10', Lv 6 10', Lv 6 10' lvl 6             TABLE         Adduction isometric x x30 x30 x30 10\" x 10    SL CS x x20 B, BTB -- x20 x30    SL Reverse CS  x20 B x30      Bridge x x30 x30 x30 x30    Figure 4 stretch   1'      Prone hip extension  x20 B --      Glenn stretch    1'x2 1' x 2                               STANDING         Wedge gastroc stretch  1' 1' 1' 1'    Heel raises on 1/2 roll  x30 x30 x30 x30    Toe raises on 1/2 roll  x30 x30 x30 x30    Leg Press DL  X20, 130# X20, 140# X20, 145# x20 145#    Leg Press SL  X20 B, 85# x20 B, 90# X20, 85# x20 85#    HS curl machine   X30, 60#      Knee extension machine   X30, 60#      Tandem standing   --      Anterior step ups  X20, 6\" X20, 6\" X20, 6\" x20 6\"    Lateral step ups   x20 B, 6\"      Anterior step down   X20, 6\" X20, 6\" x20 6\"    Rotational step up  X20, 6\" -- X20, 6\" x20 6\"    BOSU balance  1' 1' 1' 1'    BOSU squats  x15 x15 x15 x15    BOSU marches  x30       BOSU step ups    x20 B X 20 B    Retrowalk CC   -- --     Gait training - long strides  2'  40'x8     [facility] Extension  x20 B, 45# x20 B, 60# x20 B, 60# x20 B 60#    BITA Abduction  x20 B, 45# x20 B, 60# X20 B, 60# x20 B 60#    BITA Flexion  x20 B, 45# x20 B, 45# x20 B, 45# x20 B 45#    Resisted side steps   x7 laps, OTB      Sled   x7 laps x10 laps x8 laps      Manual (40550): PROm to hip flexion, abduction, IR/ER and knee flexion, prone quad stretch 10'      Therapeutic Exercise and NMR EXR  [x] (90074) Provided verbal/tactile cueing for activities related to strengthening, flexibility, endurance, ROM for improvements in LE, proximal hip, and core control with self care, mobility, lifting, ambulation.   [x] (63642) Provided verbal/tactile cueing for activities related to improving balance, coordination, kinesthetic sense, posture, motor skill, proprioception to assist with LE, proximal hip, and core control in self-care, mobility, lifting, ambulation and eccentric single leg control. NMR and Therapeutic Activities:    [x] (81082 or 51610) Provided verbal/tactile cueing for activities related to improving balance, coordination, kinesthetic sense, posture, motor skill, proprioception and motor activation to allow for proper function of core, proximal hip and LE with self-care and ADLs and functional mobility.   [] (38225) Gait Re-education- Provided training and instruction to the patient for proper LE, core and proximal hip recruitment and positioning and eccentric body weight control with ambulation re-education including up and down stairs     Home Exercise Program:    [x] (84748) Reviewed/Progressed HEP activities related to strengthening, flexibility, endurance, ROM of core, proximal hip and LE for functional self-care, mobility, lifting and ambulation/stair navigation   [] (92721) Reviewed/Progressed HEP activities related to improving balance, coordination, kinesthetic sense, posture, motor skill, proprioception of core, proximal hip and LE for self-care, mobility, lifting, and ambulation/stair navigation      Manual Treatments:  PROM / STM / Oscillations-Mobs:  G-I, II, III, IV (PA's, Inf., Post.)  [x] (39650) Provided manual therapy to mobilize LE, proximal hip and/or LS spine soft tissue/joints for the purpose of modulating pain, promoting relaxation, increasing ROM, reducing/eliminating soft tissue swelling/inflammation/restriction, improving soft tissue extensibility and allowing for proper ROM for normal function with self-care, mobility, lifting and ambulation. Modalities:     [] GAME READY (VASO)- for significant edema, swelling, pain control.      Time in: 750    Time out: 850      Charges:  Timed Code Treatment Minutes: 60   Total Treatment Minutes:  60   BWC:  TE TIME:  NMR TIME:  MANUAL TIME:   TA  UNTIMED MINUTES:  Medicare Total:   35  15  10            [] EVAL (LOW) 86342 (typically 20 minutes face-to-face)  [] EVAL (MOD) 58402 (typically 30 minutes face-to-face)  [] EVAL (HIGH) 90326 (typically 45 minutes face-to-face)  [] RE-EVAL     [x] OA(90408) 2     [] IONTO  [x] NMR (75338) 1     [] VASO  [x] Manual (56589) 1     [] Dry Needle:  [] TA x      [] Mech Traction (72024)  [] ES(attended) (87859)      [] ES (un) (79215):        GOALS:     Patient stated goal: Improve mobility, return to work.      Therapist goals for Patient:   Short Term Goals: To be achieved in: 2 weeks  1. Independent in HEP and progression per patient tolerance, in order to prevent re-injury. []? Progressing: [x]? Met: []? Not Met: []? Adjusted      2. Patient will have a decrease in pain of NRPS to <2/10 facilitate improvement in movement, function, and ADLs as indicated by Functional Deficits. []? Progressing: [x]? Met: []? Not Met: []? Adjusted      Long Term Goals: To be achieved in: 8 weeks  1. Pt will improve FOTO to 60 or more, indicating improved functional capacity. []? Progressing: [x]? Met: []? Not Met: []? Adjusted       2. Patient will demonstrate increased AROM to hip flex/knee flexion to 110/115 to allow for proper joint functioning as indicated by patients Functional Deficits. [x]? Progressing: []? Met: []? Not Met: []? Adjusted      3. Patient will demonstrate an increase in Strength to hip flexion  to 5/5 allow for proper functional mobility as indicated by patients Functional Deficits. []? Progressing: []? Met: []? Not Met: []? Adjusted      4. Patient will return to functional activities with NRPS of 0/10. []? Progressing: []? Met: []? Not Met: []? Adjusted      5. Pt will be able to ambulate stairs reciprocally (patient specific functional goal)    []? Progressing: []? Met: []? Not Met: []? Adjusted                ASSESSMENT:  Rachael Short session well. Worked on increasing stride length today. Patient received education on their current pathology and how their condition effects them with their functional activities. Patient understood discussion and questions were answered. Patient understands their activity limitations and understands rational for treatment progression. Pt educated on plan of care and HEP, if worsening symptoms to d/c that exercise. PLAN: See eval  [x] Continue per plan of care [] Alter current plan (see comments above)  [] Plan of care initiated [] Hold pending MD visit [] Discharge      Electronically signed by:  Shraad Shaikh PTA    Note: If patient does not return for scheduled/ recommended follow up visits, this note will serve as a discharge from care along with most recent update on progress.

## 2022-06-24 ENCOUNTER — HOSPITAL ENCOUNTER (OUTPATIENT)
Dept: PHYSICAL THERAPY | Age: 67
Setting detail: THERAPIES SERIES
Discharge: HOME | End: 2022-06-24
Payer: COMMERCIAL

## 2022-06-24 PROCEDURE — 97140 MANUAL THERAPY 1/> REGIONS: CPT | Performed by: SPECIALIST/TECHNOLOGIST

## 2022-06-24 PROCEDURE — 97110 THERAPEUTIC EXERCISES: CPT | Performed by: SPECIALIST/TECHNOLOGIST

## 2022-06-24 PROCEDURE — 97112 NEUROMUSCULAR REEDUCATION: CPT | Performed by: SPECIALIST/TECHNOLOGIST

## 2022-06-24 NOTE — PROGRESS NOTES
[address] [place] [address] and Sports Rehabilitation[address], [address] [place] [address] [address]  Phone: [phone]   Fax:     [phone]      Physical Therapy Treatment Note/ Progress Report:           Date:  2022    Patient Name:  Elizabeth Haines    :  1955  MRN: [id number]  Restrictions/Precautions:    Medical/Treatment Diagnosis Information:   · Diagnosis: Left hip fracture Z87.81  · Treatment Diagnosis: Left hip pain, decreased ROm and strength  Insurance/Certification information:  PT Insurance Information: Workers comp, 12 visit thru   Physician Information[de-identified] [facility]  Has the plan of care been signed (Y/N):        []  Yes  []  No     Date of Patient follow up with Physician:     Assessment Summary: Geremias Alexandra is a 73 y.o. male reporting to OP PT s/p left hip ORIF on 22 which occurred after fall at work. Pt is noted to have reduced hip and knee ROM. Moderate reduction in hip flexion strength. Still ambulating with FWW at 50% WB. Is this a Progress Report:     []  Yes  [x]  No        If Yes:  Date Range for reporting period:  Beginning   22  Ending 22    Progress report will be due (10 Rx or 30 days whichever is less):        Recertification will be due (POC Duration  / 90 days whichever is less): 22          Visit # Insurance Allowable Auth Required   In Person  12 thru   12 - []  Yes     []  No    Tele Health   []  Yes     []  No    Total 17             Functional Scale: FOTO 37, FOTO 60    Date assessed:       Latex Allergy:  [x]NO      []YES  Preferred Language for Healthcare:   [x]English       []other:      Pain level:  0/10     SUBJECTIVE:  Pt notes his hip is doing well. He uses his cane only when he feels he needs it.    OBJECTIVE:     Knee flexion 120    Hip flexion 105      RESTRICTIONS/PRECAUTIONS: 50% WB     Exercises/Interventions: HEP code: [id number]  Therapeutic Ex (80640) HEP 5/23/22 6/3/22 6/13/22 6/15/22 & 6/21/22_6/24/22    Warm-up         Rec bike  10', Lv 5 10', Lv 6 10', Lv 6 10' lvl 6             TABLE         Adduction isometric x x30 x30 x30 10\" x 10    SL CS x x20 B, BTB -- x20 x30    SL Reverse CS  x20 B x30      Bridge x x30 x30 x30 x30    Figure 4 stretch   1'      Prone hip extension  x20 B --      Glenn stretch    1'x2 1' x 2                               STANDING         Wedge gastroc stretch  1' 1' 1' 1'    Heel raises on 1/2 roll  x30 x30 x30 x30    Toe raises on 1/2 roll  x30 x30 x30 x30    Leg Press DL  X20, 130# X20, 140# X20, 145# x20 145#    Leg Press SL  X20 B, 85# x20 B, 90# X20, 85# x20 85#    HS curl machine   X30, 60#      Knee extension machine   X30, 60#      Tandem standing   --      Anterior step ups  X20, 6\" X20, 6\" X20, 6\" x20 6\"    Lateral step ups   x20 B, 6\"      Anterior step down   X20, 6\" X20, 6\" x20 6\"    Rotational step up  X20, 6\" -- X20, 6\" x20 6\"    BOSU balance  1' 1' 1' 1'    BOSU squats  x15 x15 x15 x15    BOSU marches  x30       BOSU step ups    x20 B X 20 B    [employer] CC   -- --     Gait training - long strides  2'  40'x8     [facility] Extension  x20 B, 45# x20 B, 60# x20 B, 60# x20 B 60#    BITA Abduction  x20 B, 45# x20 B, 60# X20 B, 60# x20 B 60#    BITA Flexion  x20 B, 45# x20 B, 45# x20 B, 45# x20 B 45#    Resisted side steps   x7 laps, OTB      Sled   x7 laps x10 laps x8 laps      Manual (51505): PROm to hip flexion, abduction, IR/ER and knee flexion, prone quad stretch 10'      Therapeutic Exercise and NMR EXR  [x] (11146) Provided verbal/tactile cueing for activities related to strengthening, flexibility, endurance, ROM for improvements in LE, proximal hip, and core control with self care, mobility, lifting, ambulation.   [x] (60354) Provided verbal/tactile cueing for activities related to improving balance, coordination, kinesthetic sense, posture, motor skill, proprioception to assist with LE, proximal hip, and core control in self-care, mobility, lifting, ambulation and eccentric single leg control. NMR and Therapeutic Activities:    [x] (99892 or 74868) Provided verbal/tactile cueing for activities related to improving balance, coordination, kinesthetic sense, posture, motor skill, proprioception and motor activation to allow for proper function of core, proximal hip and LE with self-care and ADLs and functional mobility.   [] (10942) Gait Re-education- Provided training and instruction to the patient for proper LE, core and proximal hip recruitment and positioning and eccentric body weight control with ambulation re-education including up and down stairs     Home Exercise Program:    [x] (94716) Reviewed/Progressed HEP activities related to strengthening, flexibility, endurance, ROM of core, proximal hip and LE for functional self-care, mobility, lifting and ambulation/stair navigation   [] (31542) Reviewed/Progressed HEP activities related to improving balance, coordination, kinesthetic sense, posture, motor skill, proprioception of core, proximal hip and LE for self-care, mobility, lifting, and ambulation/stair navigation      Manual Treatments:  PROM / STM / Oscillations-Mobs:  G-I, II, III, IV (PA's, Inf., Post.)  [x] (22149) Provided manual therapy to mobilize LE, proximal hip and/or LS spine soft tissue/joints for the purpose of modulating pain, promoting relaxation, increasing ROM, reducing/eliminating soft tissue swelling/inflammation/restriction, improving soft tissue extensibility and allowing for proper ROM for normal function with self-care, mobility, lifting and ambulation. Modalities:     [] GAME READY (VASO)- for significant edema, swelling, pain control.      Time in:810    Time out: 910      Charges:  Timed Code Treatment Minutes: 60   Total Treatment Minutes:  60   BWC:  TE TIME:  NMR TIME:  MANUAL TIME:   TA  UNTIMED MINUTES:  Medicare Total:   35  15  10            [] EVAL (LOW) 34864 (typically 20 minutes face-to-face)  [] EVAL (MOD) 88079 (typically 30 minutes face-to-face)  [] EVAL (HIGH) 10024 (typically 45 minutes face-to-face)  [] RE-EVAL     [x] ZO(96858) 2     [] IONTO  [x] NMR (30262) 1     [] VASO  [x] Manual (25989) 1     [] Dry Needle:  [] TA x      [] Mech Traction (14983)  [] ES(attended) (79973)      [] ES (un) (91586):        GOALS:     Patient stated goal: Improve mobility, return to work.      Therapist goals for Patient:   Short Term Goals: To be achieved in: 2 weeks  1. Independent in HEP and progression per patient tolerance, in order to prevent re-injury. []? Progressing: [x]? Met: []? Not Met: []? Adjusted      2. Patient will have a decrease in pain of NRPS to <2/10 facilitate improvement in movement, function, and ADLs as indicated by Functional Deficits. []? Progressing: [x]? Met: []? Not Met: []? Adjusted      Long Term Goals: To be achieved in: 8 weeks  1. Pt will improve FOTO to 60 or more, indicating improved functional capacity. []? Progressing: [x]? Met: []? Not Met: []? Adjusted       2. Patient will demonstrate increased AROM to hip flex/knee flexion to 110/115 to allow for proper joint functioning as indicated by patients Functional Deficits. [x]? Progressing: []? Met: []? Not Met: []? Adjusted      3. Patient will demonstrate an increase in Strength to hip flexion  to 5/5 allow for proper functional mobility as indicated by patients Functional Deficits. []? Progressing: []? Met: []? Not Met: []? Adjusted      4. Patient will return to functional activities with NRPS of 0/10. []? Progressing: []? Met: []? Not Met: []? Adjusted      5. Pt will be able to ambulate stairs reciprocally (patient specific functional goal)    []? Progressing: []? Met: []? Not Met: []? Adjusted                ASSESSMENT:  Irma Gallardo session well. Worked on increasing stride length today.            Patient received education on their current pathology and how their condition effects them with their functional activities. Patient understood discussion and questions were answered. Patient understands their activity limitations and understands rational for treatment progression. Pt educated on plan of care and HEP, if worsening symptoms to d/c that exercise. PLAN: See eval  [x] Continue per plan of care [] Alter current plan (see comments above)  [] Plan of care initiated [] Hold pending MD visit [] Discharge      Electronically signed by:  Shiloh Newton, PTA, MHI, ATC    Note: If patient does not return for scheduled/ recommended follow up visits, this note will serve as a discharge from care along with most recent update on progress.

## 2022-06-28 ENCOUNTER — HOSPITAL ENCOUNTER (OUTPATIENT)
Dept: PHYSICAL THERAPY | Age: 67
Setting detail: THERAPIES SERIES
Discharge: HOME | End: 2022-06-28
Payer: COMMERCIAL

## 2022-06-28 PROCEDURE — 97112 NEUROMUSCULAR REEDUCATION: CPT | Performed by: SPECIALIST/TECHNOLOGIST

## 2022-06-28 PROCEDURE — 97110 THERAPEUTIC EXERCISES: CPT | Performed by: SPECIALIST/TECHNOLOGIST

## 2022-06-28 PROCEDURE — 97140 MANUAL THERAPY 1/> REGIONS: CPT | Performed by: SPECIALIST/TECHNOLOGIST

## 2022-06-28 NOTE — PROGRESS NOTES
[address] [place] [address] and Sports Rehabilitation[address], [address] [place] [address] [address]  Phone: [phone]   Fax:     [phone]      Physical Therapy Treatment Note/ Progress Report:           Date:  2022    Patient Name:  Salvador West    :  1955  MRN: [id number]  Restrictions/Precautions:    Medical/Treatment Diagnosis Information:   · Diagnosis: Left hip fracture Z87.81  · Treatment Diagnosis: Left hip pain, decreased ROm and strength  Insurance/Certification information:  PT Insurance Information: Workers comp, 12 visit thru   Physician Information[de-identified] Nilam Sky  Has the plan of care been signed (Y/N):        []  Yes  []  No     Date of Patient follow up with Physician:     Assessment Summary: Slime Barr is a 73 y.o. male reporting to OP PT s/p left hip ORIF on 22 which occurred after fall at work. Pt is noted to have reduced hip and knee ROM. Moderate reduction in hip flexion strength. Still ambulating with FWW at 50% WB. Is this a Progress Report:     []  Yes  [x]  No        If Yes:  Date Range for reporting period:  Beginning   22  Ending 22    Progress report will be due (10 Rx or 30 days whichever is less):        Recertification will be due (POC Duration  / 90 days whichever is less): 22          Visit # Insurance Allowable Auth Required   In Person  12 thru   12 - []  Yes     []  No    Tele Health   []  Yes     []  No    Total 17             Functional Scale: FOTO 37, FOTO 60    Date assessed:       Latex Allergy:  [x]NO      []YES  Preferred Language for Healthcare:   [x]English       []other:      Pain level:  0/10     SUBJECTIVE:  Pt notes his hip is doing well. He uses his cane only when he feels he needs it.    OBJECTIVE:     Knee flexion 120    Hip flexion 105      RESTRICTIONS/PRECAUTIONS: 50% WB     Exercises/Interventions: HEP code: [id number]  Therapeutic Ex (94073) HEP 5/23/22 6/3/22 6/13/22 6/15/22 & 6/21/22_6/24/22_6/28/22    Warm-up         Rec bike  10', Lv 5 10', Lv 6 10', Lv 6 10' lvl 6             TABLE         Adduction isometric x x30 x30 x30 10\" x 10    SL CS x x20 B, BTB -- x20 x30    SL Reverse CS  x20 B x30      Bridge x x30 x30 x30 x30    Figure 4 stretch   1'      Prone hip extension  x20 B --      Glenn stretch    1'x2 1' x 2                               STANDING         Wedge gastroc stretch  1' 1' 1' 1'    Heel raises on 1/2 roll  x30 x30 x30 x30    Toe raises on 1/2 roll  x30 x30 x30 x30    Leg Press DL  X20, 130# X20, 140# X20, 145# x20 145#    Leg Press SL  X20 B, 85# x20 B, 90# X20, 85# x20 85#    HS curl machine   X30, 60#  x30 60#    Knee extension machine   X30, 60#  x30 60#    Tandem standing   --      Anterior step ups  X20, 6\" X20, 6\" X20, 6\" x20 6\"    Lateral step ups   x20 B, 6\"      Anterior step down   X20, 6\" X20, 6\" x20 6\"    Rotational step up  X20, 6\" -- X20, 6\" x20 6\"    BOSU balance  1' 1' 1' 1'    BOSU squats  x15 x15 x15 x15    BOSU marches  x30       BOSU step ups    x20 B X 20 B    [facility]    -- --     Gait training - long strides  2'  40'x8     [facility] [place] Extension  x20 B, 45# x20 B, 60# x20 B, 60# x20 B 60#    BITA Abduction  x20 B, 45# x20 B, 60# X20 B, 60# x20 B 60#    BITA Flexion  x20 B, 45# x20 B, 45# x20 B, 45# x20 B 45#    Resisted side steps   x7 laps, OTB      Sled   x7 laps x10 laps x8 laps      Manual (36549): PROm to hip flexion, abduction, IR/ER and knee flexion, prone quad stretch 10'      Therapeutic Exercise and NMR EXR  [x] (66823) Provided verbal/tactile cueing for activities related to strengthening, flexibility, endurance, ROM for improvements in LE, proximal hip, and core control with self care, mobility, lifting, ambulation.   [x] (65987) Provided verbal/tactile cueing for activities related to improving balance, coordination, kinesthetic sense, posture, motor skill, proprioception to assist with LE, proximal hip, and core control in self-care, mobility, lifting, ambulation and eccentric single leg control. NMR and Therapeutic Activities:    [x] (05154 or 65307) Provided verbal/tactile cueing for activities related to improving balance, coordination, kinesthetic sense, posture, motor skill, proprioception and motor activation to allow for proper function of core, proximal hip and LE with self-care and ADLs and functional mobility.   [] (33370) Gait Re-education- Provided training and instruction to the patient for proper LE, core and proximal hip recruitment and positioning and eccentric body weight control with ambulation re-education including up and down stairs     Home Exercise Program:    [x] (27273) Reviewed/Progressed HEP activities related to strengthening, flexibility, endurance, ROM of core, proximal hip and LE for functional self-care, mobility, lifting and ambulation/stair navigation   [] (55111) Reviewed/Progressed HEP activities related to improving balance, coordination, kinesthetic sense, posture, motor skill, proprioception of core, proximal hip and LE for self-care, mobility, lifting, and ambulation/stair navigation      Manual Treatments:  PROM / STM / Oscillations-Mobs:  G-I, II, III, IV (PA's, Inf., Post.)  [x] (81950) Provided manual therapy to mobilize LE, proximal hip and/or LS spine soft tissue/joints for the purpose of modulating pain, promoting relaxation, increasing ROM, reducing/eliminating soft tissue swelling/inflammation/restriction, improving soft tissue extensibility and allowing for proper ROM for normal function with self-care, mobility, lifting and ambulation. Modalities:     [] GAME READY (VASO)- for significant edema, swelling, pain control.      Time in:810    Time out: 910      Charges:  Timed Code Treatment Minutes: 60   Total Treatment Minutes:  60   BWC:  TE TIME:  NMR TIME:  MANUAL TIME:   TA  UNTIMED MINUTES:  Medicare Total:   35  15  10            [] EVAL (LOW) 76400 (typically 20 minutes face-to-face)  [] EVAL (MOD) 47739 (typically 30 minutes face-to-face)  [] EVAL (HIGH) 91699 (typically 45 minutes face-to-face)  [] RE-EVAL     [x] ZQ(32616) 2     [] IONTO  [x] NMR (08389) 1     [] VASO  [x] Manual (52265) 1     [] Dry Needle:  [] TA x      [] Mech Traction (56117)  [] ES(attended) (96711)      [] ES (un) (62659):        GOALS:     Patient stated goal: Improve mobility, return to work.      Therapist goals for Patient:   Short Term Goals: To be achieved in: 2 weeks  1. Independent in HEP and progression per patient tolerance, in order to prevent re-injury. []? Progressing: [x]? Met: []? Not Met: []? Adjusted      2. Patient will have a decrease in pain of NRPS to <2/10 facilitate improvement in movement, function, and ADLs as indicated by Functional Deficits. []? Progressing: [x]? Met: []? Not Met: []? Adjusted      Long Term Goals: To be achieved in: 8 weeks  1. Pt will improve FOTO to 60 or more, indicating improved functional capacity. []? Progressing: [x]? Met: []? Not Met: []? Adjusted       2. Patient will demonstrate increased AROM to hip flex/knee flexion to 110/115 to allow for proper joint functioning as indicated by patients Functional Deficits. [x]? Progressing: []? Met: []? Not Met: []? Adjusted      3. Patient will demonstrate an increase in Strength to hip flexion  to 5/5 allow for proper functional mobility as indicated by patients Functional Deficits. []? Progressing: []? Met: []? Not Met: []? Adjusted      4. Patient will return to functional activities with NRPS of 0/10. []? Progressing: []? Met: []? Not Met: []? Adjusted      5. Pt will be able to ambulate stairs reciprocally (patient specific functional goal)    []? Progressing: []? Met: []? Not Met: []? Adjusted                ASSESSMENT:  Celestine squires. Worked on increasing stride length today.            Patient received education on their current pathology and how their condition effects them with their functional activities. Patient understood discussion and questions were answered. Patient understands their activity limitations and understands rational for treatment progression. Pt educated on plan of care and HEP, if worsening symptoms to d/c that exercise. PLAN: See eval  [x] Continue per plan of care [] Alter current plan (see comments above)  [] Plan of care initiated [] Hold pending MD visit [] Discharge      Electronically signed by:  Dio Shaver, PTA, MHI, ATC    Note: If patient does not return for scheduled/ recommended follow up visits, this note will serve as a discharge from care along with most recent update on progress.

## 2022-07-01 ENCOUNTER — HOSPITAL ENCOUNTER (OUTPATIENT)
Dept: PHYSICAL THERAPY | Age: 67
Setting detail: THERAPIES SERIES
Discharge: HOME | End: 2022-07-01
Payer: COMMERCIAL

## 2022-07-01 PROCEDURE — 97110 THERAPEUTIC EXERCISES: CPT

## 2022-07-01 PROCEDURE — 97112 NEUROMUSCULAR REEDUCATION: CPT

## 2022-07-01 PROCEDURE — 97140 MANUAL THERAPY 1/> REGIONS: CPT

## 2022-07-01 NOTE — PROGRESS NOTES
[address] [facility] and Sports Rehabilitation[address] Erika ramirez, [address] [facility] [address]  Phone: [phone]   Fax: [phone]    Date: 2022          Patient Name; :  Trupti Tatum; 1955   Dx: Diagnosis: Left hip fracture Z87.81      Physician[de-identified] Marcella Rice        Total PT Visits: 18     Measures Previous Current   Pain (0-10)     Disability % 37 67     Knee flexion 120     Hip flexion 110    Hip IR 20    Assessment:  Stacy Fry has been seen in PT for 18 visits for left hip ORIF. Pt has responded well overall to PT session which have been addressing progress ROM and strength. ROM has improved significantly; hip flexion and IR and knee flexion have made nice improvements. Still ambulating with moderate gait deviation but is now using cane minimally. Pt will continue to benefit from PT addressing deficits and normalize gait in order to regain full function and improve QOL. Prognosis for POC: [x] Good [] Fair  [] Poor      Patient requires continued skilled intervention: [] Yes  [] No        Plan & Recommendations:  [x] Continue rehabilitation due to objective improvement and continued functional deficits with frequency and duration: continue 2x week, 4 weeks. [] Progress toward  []GAP, []Work Conditioning, []Independent HEP   [] Discharge due to   [] All goals achieved, [] Maximized \"medical necessity\" [] No subjective or objective improvements      Electronically signed by:  Barbara Olivares, PT  Therapy Plan of Care Re-Certification  This patient has been re-evaluated for physical therapy services and for therapy to continue, Medicare, Medicaid and other insurances require periodic physician review of the treatment plan.  Please review the above re-evaluation and verify that you agree with plan of care as established above by signing the attached document and return it to our office or note changes to established plan below  [] Follow treatment plan as

## 2022-07-01 NOTE — FLOWSHEET NOTE
[address] [place] [address] and [facility], [address] [place] [address] [address]  Phone: [phone]   Fax:     [phone]      Physical Therapy Treatment Note/ Progress Report:           Date:  2022    Patient Name:  Mejia Albright    :  1955  MRN: [id number]  Restrictions/Precautions:    Medical/Treatment Diagnosis Information:   · Diagnosis: Left hip fracture Z87.81  · Treatment Diagnosis: Left hip pain, decreased ROm and strength  Insurance/Certification information:  PT Insurance Information: Workers comp, 12 visit thru   Physician Information[de-identified] Oddis Pae  Has the plan of care been signed (Y/N):        []  Yes  []  No     Date of Patient follow up with Physician:     Assessment Summary: Marixa Rock is a 73 y.o. male reporting to OP PT s/p left hip ORIF on 22 which occurred after fall at work. Pt is noted to have reduced hip and knee ROM. Moderate reduction in hip flexion strength. Still ambulating with FWW at 50% WB. Is this a Progress Report:     []  Yes  [x]  No        If Yes:  Date Range for reporting period:  Beginning   22  Ending 22    Progress report will be due (10 Rx or 30 days whichever is less): 27       Recertification will be due (POC Duration  / 90 days whichever is less): 22          Visit # Insurance Allowable Auth Required   In Person  12 thru   12 - []  Yes     []  No    Tele Health   []  Yes     []  No    Total 18             Functional Scale: FOTO 37, 60, 67    Date assessed:       Latex Allergy:  [x]NO      []YES  Preferred Language for Healthcare:   [x]English       []other:      Pain level:  0/10     SUBJECTIVE:  Pt states hip gets stiff in morning and towards end of day.            OBJECTIVE:     Knee flexion 120    Hip flexion 110      RESTRICTIONS/PRECAUTIONS: 50% WB     Exercises/Interventions: HEP code: [id number]  Therapeutic Ex () HEP 22   Warm-up       Rec bike  10', Lv 6 10' lvl 6 10', Lv 6          TABLE       Adduction isometric x x30 10\" x 10 x30   SL CS x x20 x30 --   SL Reverse CS    x30   Bridge x x30 x30 x30   Figure 4 stretch       Prone hip extension       Glenn stretch  1'x2 1' x 2                         STANDING       Wedge gastroc stretch  1' 1' 1'   Heel raises on 1/2 roll  x30 x30 x30   Toe raises on 1/2 roll  x30 x30 x30   Leg Press DL  X20, 145# x20 145# X20, 145#   Leg Press SL  X20, 85# x20 85# X20, 85#   HS curl machine   x30 60#    Knee extension machine   x30 60#    Tandem standing       Anterior step ups  X20, 6\" x20 6\"    Lateral step ups       Anterior step down  X20, 6\" x20 6\"    Rotational step up  X20, 6\" x20 6\"    BOSU balance  1' 1'    BOSU squats  x15 x15 x20   BOSU marches       BOSU step ups  x20 B X 20 B x30 B   Retrowalk CC  --     Gait training - long strides  40'x8     BITA Extension  x20 B, 60# x20 B 60#    BITA Abduction  X20 B, 60# x20 B 60#    BITA Flexion  x20 B, 45# x20 B 45#    Resisted side steps    x8 laps, OTB   Monster walks    x2 laps, OTB   Sled  x10 laps x8 laps x8 laps     Manual (95921): PROm to hip flexion, abduction, IR/ER and knee flexion, prone quad stretch 10'      Therapeutic Exercise and NMR EXR  [x] (11300) Provided verbal/tactile cueing for activities related to strengthening, flexibility, endurance, ROM for improvements in LE, proximal hip, and core control with self care, mobility, lifting, ambulation. [x] (78135) Provided verbal/tactile cueing for activities related to improving balance, coordination, kinesthetic sense, posture, motor skill, proprioception to assist with LE, proximal hip, and core control in self-care, mobility, lifting, ambulation and eccentric single leg control.      NMR and Therapeutic Activities:    [x] (95394 or 34924) Provided verbal/tactile cueing for activities related to improving balance, coordination, kinesthetic sense, posture, motor skill, proprioception and motor activation to allow for proper function of core, proximal hip and LE with self-care and ADLs and functional mobility.   [] (20522) Gait Re-education- Provided training and instruction to the patient for proper LE, core and proximal hip recruitment and positioning and eccentric body weight control with ambulation re-education including up and down stairs     Home Exercise Program:    [x] (47278) Reviewed/Progressed HEP activities related to strengthening, flexibility, endurance, ROM of core, proximal hip and LE for functional self-care, mobility, lifting and ambulation/stair navigation   [] (65056) Reviewed/Progressed HEP activities related to improving balance, coordination, kinesthetic sense, posture, motor skill, proprioception of core, proximal hip and LE for self-care, mobility, lifting, and ambulation/stair navigation      Manual Treatments:  PROM / STM / Oscillations-Mobs:  G-I, II, III, IV (PA's, Inf., Post.)  [x] (59259) Provided manual therapy to mobilize LE, proximal hip and/or LS spine soft tissue/joints for the purpose of modulating pain, promoting relaxation, increasing ROM, reducing/eliminating soft tissue swelling/inflammation/restriction, improving soft tissue extensibility and allowing for proper ROM for normal function with self-care, mobility, lifting and ambulation. Modalities:     [] GAME READY (VASO)- for significant edema, swelling, pain control.      Time in:825    Time out: 920      Charges:  Timed Code Treatment Minutes: 55   Total Treatment Minutes:  55   BWC:  TE TIME:  NMR TIME:  MANUAL TIME:   TA  UNTIMED MINUTES:  Medicare Total:   35  15  10            [] EVAL (LOW) 24986 (typically 20 minutes face-to-face)  [] EVAL (MOD) 24081 (typically 30 minutes face-to-face)  [] EVAL (HIGH) 56154 (typically 45 minutes face-to-face)  [] RE-EVAL     [x] QY(37393) 2     [] IONTO  [x] NMR (87764) 1     [] VASO  [x] Manual (49459) 1     [] Dry Needle:  [] TA x      [] Mech Traction (18113)  [] ES(attended) (05620)      [] ES (un) (61060):        GOALS:     Patient stated goal: Improve mobility, return to work.      Therapist goals for Patient:   Short Term Goals: To be achieved in: 2 weeks  1. Independent in HEP and progression per patient tolerance, in order to prevent re-injury. []? Progressing: [x]? Met: []? Not Met: []? Adjusted      2. Patient will have a decrease in pain of NRPS to <2/10 facilitate improvement in movement, function, and ADLs as indicated by Functional Deficits. []? Progressing: [x]? Met: []? Not Met: []? Adjusted      Long Term Goals: To be achieved in: 8 weeks  1. Pt will improve FOTO to 60 or more, indicating improved functional capacity. []? Progressing: [x]? Met: []? Not Met: []? Adjusted       2. Patient will demonstrate increased AROM to hip flex/knee flexion to 110/115 to allow for proper joint functioning as indicated by patients Functional Deficits. []? Progressing: [x]? Met: []? Not Met: []? Adjusted      3. Patient will demonstrate an increase in Strength to hip flexion  to 5/5 allow for proper functional mobility as indicated by patients Functional Deficits. [x]? Progressing: []? Met: []? Not Met: []? Adjusted      4. Patient will return to functional activities with NRPS of 0/10. [x]? Progressing: []? Met: []? Not Met: []? Adjusted      5. Pt will be able to ambulate stairs reciprocally (patient specific functional goal)    []? Progressing: [x]? Met: []? Not Met: []? Adjusted                ASSESSMENT:  [place] has been seen in PT for 18 visits for left hip ORIF. Pt has responded well overall to PT session which have been addressing progress ROM and strength. ROM has improved significantly; hip flexion and IR and knee flexion have made nice improvements. Still ambulating with moderate gait deviation but is now using cane minimally. Pt will continue to benefit from PT addressing deficits and normalize gait in order to regain full function and improve QOL. Patient received education on their current pathology and how their condition effects them with their functional activities. Patient understood discussion and questions were answered. Patient understands their activity limitations and understands rational for treatment progression. Pt educated on plan of care and HEP, if worsening symptoms to d/c that exercise. PLAN: See eval  [x] Continue per plan of care [] Alter current plan (see comments above)  [] Plan of care initiated [] Hold pending MD visit [] Discharge      Electronically signed by:  Flex Landeros, PT, MHI, ATC    Note: If patient does not return for scheduled/ recommended follow up visits, this note will serve as a discharge from care along with most recent update on progress.

## 2022-07-05 ENCOUNTER — HOSPITAL ENCOUNTER (OUTPATIENT)
Dept: PHYSICAL THERAPY | Age: 67
Setting detail: THERAPIES SERIES
Discharge: HOME | End: 2022-07-05
Payer: COMMERCIAL

## 2022-07-05 PROCEDURE — 97112 NEUROMUSCULAR REEDUCATION: CPT

## 2022-07-05 PROCEDURE — 97110 THERAPEUTIC EXERCISES: CPT

## 2022-07-05 PROCEDURE — 97140 MANUAL THERAPY 1/> REGIONS: CPT

## 2022-07-05 NOTE — FLOWSHEET NOTE
[address] [place] [address] and Sports Rehabilitation[address], [address] [place] [address] [address]  Phone: [phone]   Fax:     [phone]      Physical Therapy Treatment Note/ Progress Report:           Date:  2022    Patient Name:  Joanne Martinez    :  1955  MRN: [id number]  Restrictions/Precautions:    Medical/Treatment Diagnosis Information:   · Diagnosis: Left hip fracture Z87.81  · Treatment Diagnosis: Left hip pain, decreased ROm and strength  Insurance/Certification information:  PT Insurance Information: Workers comp, 12 visit thru   Physician Information[de-identified] Chrystine Sandhoff  Has the plan of care been signed (Y/N):        []  Yes  []  No     Date of Patient follow up with Physician:     Assessment Summary: Broderick Bauer is a 73 y.o. male reporting to OP PT s/p left hip ORIF on 22 which occurred after fall at work. Pt is noted to have reduced hip and knee ROM. Moderate reduction in hip flexion strength. Still ambulating with FWW at 50% WB. Is this a Progress Report:     []  Yes  [x]  No        If Yes:  Date Range for reporting period:  Beginning   22  Ending 22    Progress report will be due (10 Rx or 30 days whichever is less): 76       Recertification will be due (POC Duration  / 90 days whichever is less): 22          Visit # Insurance Allowable Auth Required   In Person  12 thru   12 - []  Yes     []  No    Tele Health   []  Yes     []  No    Total 19             Functional Scale: FOTO 37, 60, 67    Date assessed:       Latex Allergy:  [x]NO      []YES  Preferred Language for Healthcare:   [x]English       []other:      Pain level:  0/10     SUBJECTIVE:  Pt states hip gets stiff in morning and towards end of day.            OBJECTIVE:     Knee flexion 120    Hip flexion 110      RESTRICTIONS/PRECAUTIONS: 50% WB     Exercises/Interventions: HEP code: [id number]  Therapeutic Ex (95802) HEP 22   Warm-up Rec bike  10', Lv 6 10' lvl 6 10', Lv 6          TABLE       Adduction isometric x x30 -- x30   SL CS x x20 -- --   SL Reverse CS   x30 x30   Bridge x x30 x30 x30   Figure 4 stretch       Prone hip extension       Glenn stretch  1'x2 --                         STANDING       Wedge gastroc stretch  1' 1' 1'   Heel raises on 1/2 roll  x30 x30 x30   Toe raises on 1/2 roll  x30 x30 x30   Leg Press DL  X20, 145# x20 145# X20, 145#   Leg Press SL  X20, 85# x20 85# X20, 85#   HS curl machine   x30 60#    Knee extension machine   x30 60#    Tandem standing       Anterior step ups  X20, 6\" x20 6\"    Lateral step ups       Anterior step down  X20, 6\" x20 6\"    Rotational step up  X20, 6\" x20 6\"    BOSU balance  1' 1'    BOSU squats  x15 x15 x20   BOSU marches       BOSU step ups  x20 B X 20 B x30 B   Retrowalk CC  -- X15, 65#    Gait training - long strides  40'x8     BITA Extension  x20 B, 60# x20 B 60#    BITA Abduction  X20 B, 60# x20 B 60#    BITA Flexion  x20 B, 45# x20 B 45#    Resisted side steps   2 laps, OTB x8 laps, OTB   Monster walks   2 laps, OTB x2 laps, OTB   Sled  x10 laps -- x8 laps     Manual (91095): PROm to hip flexion, abduction, IR/ER and knee flexion, prone quad stretch 10'      Therapeutic Exercise and NMR EXR  [x] (19346) Provided verbal/tactile cueing for activities related to strengthening, flexibility, endurance, ROM for improvements in LE, proximal hip, and core control with self care, mobility, lifting, ambulation. [x] (15814) Provided verbal/tactile cueing for activities related to improving balance, coordination, kinesthetic sense, posture, motor skill, proprioception to assist with LE, proximal hip, and core control in self-care, mobility, lifting, ambulation and eccentric single leg control.      NMR and Therapeutic Activities:    [x] (66847 or 68147) Provided verbal/tactile cueing for activities related to improving balance, coordination, kinesthetic sense, posture, motor skill, proprioception and motor activation to allow for proper function of core, proximal hip and LE with self-care and ADLs and functional mobility.   [] (82529) Gait Re-education- Provided training and instruction to the patient for proper LE, core and proximal hip recruitment and positioning and eccentric body weight control with ambulation re-education including up and down stairs     Home Exercise Program:    [x] (19837) Reviewed/Progressed HEP activities related to strengthening, flexibility, endurance, ROM of core, proximal hip and LE for functional self-care, mobility, lifting and ambulation/stair navigation   [] (40044) Reviewed/Progressed HEP activities related to improving balance, coordination, kinesthetic sense, posture, motor skill, proprioception of core, proximal hip and LE for self-care, mobility, lifting, and ambulation/stair navigation      Manual Treatments:  PROM / STM / Oscillations-Mobs:  G-I, II, III, IV (PA's, Inf., Post.)  [x] (68616) Provided manual therapy to mobilize LE, proximal hip and/or LS spine soft tissue/joints for the purpose of modulating pain, promoting relaxation, increasing ROM, reducing/eliminating soft tissue swelling/inflammation/restriction, improving soft tissue extensibility and allowing for proper ROM for normal function with self-care, mobility, lifting and ambulation. Modalities:     [] GAME READY (VASO)- for significant edema, swelling, pain control.      Time in:735    Time out: 830      Charges:  Timed Code Treatment Minutes: 55   Total Treatment Minutes:  55   BWC:  TE TIME:  NMR TIME:  MANUAL TIME:   TA  UNTIMED MINUTES:  Medicare Total:   35  15  10            [] EVAL (LOW) 94678 (typically 20 minutes face-to-face)  [] EVAL (MOD) 68534 (typically 30 minutes face-to-face)  [] EVAL (HIGH) 56544 (typically 45 minutes face-to-face)  [] RE-EVAL     [x] JH(08366) 2     [] IONTO  [x] NMR (95504) 1     [] VASO  [x] Manual (06498) 1     [] Dry Needle:  [] TA x      [] Justine Barton Traction (98982)  [] ES(attended) (51420)      [] ES (un) (97699):        GOALS:     Patient stated goal: Improve mobility, return to work.      Therapist goals for Patient:   Short Term Goals: To be achieved in: 2 weeks  1. Independent in HEP and progression per patient tolerance, in order to prevent re-injury. []? Progressing: [x]? Met: []? Not Met: []? Adjusted      2. Patient will have a decrease in pain of NRPS to <2/10 facilitate improvement in movement, function, and ADLs as indicated by Functional Deficits. []? Progressing: [x]? Met: []? Not Met: []? Adjusted      Long Term Goals: To be achieved in: 8 weeks  1. Pt will improve FOTO to 60 or more, indicating improved functional capacity. []? Progressing: [x]? Met: []? Not Met: []? Adjusted       2. Patient will demonstrate increased AROM to hip flex/knee flexion to 110/115 to allow for proper joint functioning as indicated by patients Functional Deficits. []? Progressing: [x]? Met: []? Not Met: []? Adjusted      3. Patient will demonstrate an increase in Strength to hip flexion  to 5/5 allow for proper functional mobility as indicated by patients Functional Deficits. [x]? Progressing: []? Met: []? Not Met: []? Adjusted      4. Patient will return to functional activities with NRPS of 0/10. [x]? Progressing: []? Met: []? Not Met: []? Adjusted      5. Pt will be able to ambulate stairs reciprocally (patient specific functional goal)    []? Progressing: [x]? Met: []? Not Met: []? Adjusted                ASSESSMENT:  Emmanuel Alexandra session well. Gait continues to improve. Patient received education on their current pathology and how their condition effects them with their functional activities. Patient understood discussion and questions were answered. Patient understands their activity limitations and understands rational for treatment progression. Pt educated on plan of care and HEP, if worsening symptoms to d/c that exercise. PLAN: See eval  [x] Continue per plan of care [] Alter current plan (see comments above)  [] Plan of care initiated [] Hold pending MD visit [] Discharge      Electronically signed by:  Johnson Castro, PT, MHI, ATC    Note: If patient does not return for scheduled/ recommended follow up visits, this note will serve as a discharge from care along with most recent update on progress.

## 2022-07-08 ENCOUNTER — HOSPITAL ENCOUNTER (OUTPATIENT)
Dept: PHYSICAL THERAPY | Age: 67
Setting detail: THERAPIES SERIES
Discharge: HOME | End: 2022-07-08
Payer: COMMERCIAL

## 2022-07-08 PROCEDURE — 97112 NEUROMUSCULAR REEDUCATION: CPT

## 2022-07-08 PROCEDURE — 97110 THERAPEUTIC EXERCISES: CPT

## 2022-07-08 NOTE — FLOWSHEET NOTE
[address] [place] [address] and [facility] Rehabilitation[address], [address] [place] [address] [address]  Phone: [phone]   Fax:     [phone]      Physical Therapy Treatment Note/ Progress Report:           Date:  2022    Patient Name:  Rihannon Villarreal    :  1955  MRN: [id number]  Restrictions/Precautions:    Medical/Treatment Diagnosis Information:   · Diagnosis: Left hip fracture Z87.81  · Treatment Diagnosis: Left hip pain, decreased ROm and strength  Insurance/Certification information:  PT Insurance Information: Workers comp, 12 visit thru   Physician Information[de-identified] Kayla Brush  Has the plan of care been signed (Y/N):        []  Yes  []  No     Date of Patient follow up with Physician:     Assessment Summary: Socrates Flaherty is a 73 y.o. male reporting to OP PT s/p left hip ORIF on 22 which occurred after fall at work. Pt is noted to have reduced hip and knee ROM. Moderate reduction in hip flexion strength. Still ambulating with FWW at 50% WB. Is this a Progress Report:     []  Yes  [x]  No        If Yes:  Date Range for reporting period:  Beginning   22  Ending 22    Progress report will be due (10 Rx or 30 days whichever is less):        Recertification will be due (POC Duration  / 90 days whichever is less): 22          Visit # Insurance Allowable Auth Required   In Person  12 thru   12 - []  Yes     []  No    Tele Health   []  Yes     []  No    Total 20             Functional Scale: FOTO 37, 60, 67    Date assessed:       Latex Allergy:  [x]NO      []YES  Preferred Language for Healthcare:   [x]English       []other:      Pain level:  1/10     SUBJECTIVE:  Pt states hip is a little sore today.            OBJECTIVE:     Knee flexion 120    Hip flexion 110      RESTRICTIONS/PRECAUTIONS: 50% WB     Exercises/Interventions: HEP code: [id number]  Therapeutic Ex (68373) HEP 22   Warm-up        Rec bike 10', Lv 6 10' lvl 6 10', Lv 6 10', Lv 6           TABLE        Adduction isometric x x30 -- x30    SL CS x x20 -- --    SL Reverse CS   x30 x30    Bridge x x30 x30 x30    Figure 4 stretch        Prone hip extension        Glenn stretch  1'x2 --                             STANDING        Wedge gastroc stretch  1' 1' 1' 1'   Heel raises on 1/2 roll  x30 x30 x30 x30   Toe raises on 1/2 roll  x30 x30 x30 x30   Leg Press DL  X20, 145# x20 145# X20, 145# X20, 1503   Leg Press SL  X20, 85# x20 85# X20, 85# X20, 90#   HS curl machine   x30 60#  X30, 65#   Knee extension machine   x30 60#  X30, 65#   Tandem standing        Anterior step ups  X20, 6\" x20 6\"     Lateral step ups        Anterior step down  X20, 6\" x20 6\"     Rotational step up  X20, 6\" x20 6\"     BOSU balance  1' 1'     BOSU squats  x15 x15 x20 x20   BOSU marches        BOSU step ups  x20 B X 20 B x30 B x30 B   BOSU lateral lunge     x20 B   Retrowalk CC  -- X15, 65#     Gait training - long strides  40'x8      BITA Extension  x20 B, 60# x20 B 60#  x20 B, 75#   BITA Abduction  X20 B, 60# x20 B 60#  x20 B, 75#   BITA Flexion  x20 B, 45# x20 B 45#  x20 B, 60#   Resisted side steps   2 laps, OTB x8 laps, OTB    Monster walks   2 laps, OTB x2 laps, OTB    Sled  x10 laps -- x8 laps x10 laps     Manual (22967): Therapeutic Exercise and NMR EXR  [x] (17191) Provided verbal/tactile cueing for activities related to strengthening, flexibility, endurance, ROM for improvements in LE, proximal hip, and core control with self care, mobility, lifting, ambulation. [x] (98968) Provided verbal/tactile cueing for activities related to improving balance, coordination, kinesthetic sense, posture, motor skill, proprioception to assist with LE, proximal hip, and core control in self-care, mobility, lifting, ambulation and eccentric single leg control.      NMR and Therapeutic Activities:    [x] (07291 or 98211) Provided verbal/tactile cueing for activities related to improving balance, coordination, kinesthetic sense, posture, motor skill, proprioception and motor activation to allow for proper function of core, proximal hip and LE with self-care and ADLs and functional mobility.   [] (78803) Gait Re-education- Provided training and instruction to the patient for proper LE, core and proximal hip recruitment and positioning and eccentric body weight control with ambulation re-education including up and down stairs     Home Exercise Program:    [x] (67309) Reviewed/Progressed HEP activities related to strengthening, flexibility, endurance, ROM of core, proximal hip and LE for functional self-care, mobility, lifting and ambulation/stair navigation   [] (13199) Reviewed/Progressed HEP activities related to improving balance, coordination, kinesthetic sense, posture, motor skill, proprioception of core, proximal hip and LE for self-care, mobility, lifting, and ambulation/stair navigation      Manual Treatments:  PROM / STM / Oscillations-Mobs:  G-I, II, III, IV (PA's, Inf., Post.)  [x] (60003) Provided manual therapy to mobilize LE, proximal hip and/or LS spine soft tissue/joints for the purpose of modulating pain, promoting relaxation, increasing ROM, reducing/eliminating soft tissue swelling/inflammation/restriction, improving soft tissue extensibility and allowing for proper ROM for normal function with self-care, mobility, lifting and ambulation. Modalities:     [] GAME READY (VASO)- for significant edema, swelling, pain control.      Time in:730    Time out: 823      Charges:  Timed Code Treatment Minutes: 53   Total Treatment Minutes:  53   BWC:  TE TIME:  NMR TIME:  MANUAL TIME:   TA  UNTIMED MINUTES:  Medicare Total:   30  23              [] EVAL (LOW) 36408 (typically 20 minutes face-to-face)  [] EVAL (MOD) 90910 (typically 30 minutes face-to-face)  [] EVAL (HIGH) 54546 (typically 45 minutes face-to-face)  [] RE-EVAL     [x] RC(29421) 2     [] IONTO  [x] NMR (17248) 2     [] VASO  [] Manual (87064)      [] Dry Needle:  [] TA x      [] Mech Traction (26209)  [] ES(attended) (91901)      [] ES (un) (10410):        GOALS:     Patient stated goal: Improve mobility, return to work.      Therapist goals for Patient:   Short Term Goals: To be achieved in: 2 weeks  1. Independent in HEP and progression per patient tolerance, in order to prevent re-injury. []? Progressing: [x]? Met: []? Not Met: []? Adjusted      2. Patient will have a decrease in pain of NRPS to <2/10 facilitate improvement in movement, function, and ADLs as indicated by Functional Deficits. []? Progressing: [x]? Met: []? Not Met: []? Adjusted      Long Term Goals: To be achieved in: 8 weeks  1. Pt will improve FOTO to 60 or more, indicating improved functional capacity. []? Progressing: [x]? Met: []? Not Met: []? Adjusted       2. Patient will demonstrate increased AROM to hip flex/knee flexion to 110/115 to allow for proper joint functioning as indicated by patients Functional Deficits. []? Progressing: [x]? Met: []? Not Met: []? Adjusted      3. Patient will demonstrate an increase in Strength to hip flexion  to 5/5 allow for proper functional mobility as indicated by patients Functional Deficits. [x]? Progressing: []? Met: []? Not Met: []? Adjusted      4. Patient will return to functional activities with NRPS of 0/10. [x]? Progressing: []? Met: []? Not Met: []? Adjusted      5. Pt will be able to ambulate stairs reciprocally (patient specific functional goal)    []? Progressing: [x]? Met: []? Not Met: []? Adjusted                ASSESSMENT:  Tresea Cool session well. Gait continues to improve. Patient received education on their current pathology and how their condition effects them with their functional activities. Patient understood discussion and questions were answered. Patient understands their activity limitations and understands rational for treatment progression.   Pt educated on plan of care and HEP, if worsening symptoms to d/c that exercise. PLAN: See eval  [x] Continue per plan of care [] Alter current plan (see comments above)  [] Plan of care initiated [] Hold pending MD visit [] Discharge      Electronically signed by:  Marvin Meehan, PT, MHI, ATC    Note: If patient does not return for scheduled/ recommended follow up visits, this note will serve as a discharge from care along with most recent update on progress.

## 2022-07-12 ENCOUNTER — HOSPITAL ENCOUNTER (OUTPATIENT)
Dept: PHYSICAL THERAPY | Age: 67
Setting detail: THERAPIES SERIES
Discharge: HOME | End: 2022-07-12
Payer: COMMERCIAL

## 2022-07-12 PROCEDURE — 97112 NEUROMUSCULAR REEDUCATION: CPT

## 2022-07-12 PROCEDURE — 97110 THERAPEUTIC EXERCISES: CPT

## 2022-07-12 NOTE — FLOWSHEET NOTE
6 10', Lv 6            TABLE        Adduction isometric x -- x30     SL CS x -- --     SL Reverse CS  x30 x30     Bridge x x30 x30     Figure 4 stretch        Glenn stretch  --                              STANDING        Wedge gastroc stretch  1' 1' 1'    Heel raises on 1/2 roll  x30 x30 x30    Toe raises on 1/2 roll  x30 x30 x30    Leg Press DL  x20 145# X20, 145# X20, 1503    Leg Press SL  x20 85# X20, 85# X20, 90#    HS curl machine  x30 60#  X30, 65#    Knee extension machine  x30 60#  X30, 65#    Tandem standing        Anterior step ups  x20 6\"      Lateral step ups        Anterior step down  x20 6\"      Rotational step up  x20 6\"  x20 B, 6\"    BOSU balance  1'      BOSU squats  x15 x20 x20    BOSU marches        BOSU step ups  X 20 B x30 B x30 B    BOSU lateral lunge    x20 B    Retrowalk CC  X15, 65#      Gait training - long strides        BITA Extension  x20 B 60#  x20 B, 75#    BITA Abduction  x20 B 60#  x20 B, 75#    BITA Flexion  x20 B 45#  x20 B, 60#    Resisted side steps  2 laps, OTB x8 laps, OTB 10'x3 laps, GTB    Monster walks  2 laps, OTB x2 laps, OTB 10'x2 laps, GTB    Sled  -- x8 laps x10 laps    SL RDL    x10 B              Manual (87688): Therapeutic Exercise and NMR EXR  [x] (58916) Provided verbal/tactile cueing for activities related to strengthening, flexibility, endurance, ROM for improvements in LE, proximal hip, and core control with self care, mobility, lifting, ambulation. [x] (16463) Provided verbal/tactile cueing for activities related to improving balance, coordination, kinesthetic sense, posture, motor skill, proprioception to assist with LE, proximal hip, and core control in self-care, mobility, lifting, ambulation and eccentric single leg control.      NMR and Therapeutic Activities:    [x] (80796 or 73887) Provided verbal/tactile cueing for activities related to improving balance, coordination, kinesthetic sense, posture, motor skill, proprioception and motor activation to allow for proper function of core, proximal hip and LE with self-care and ADLs and functional mobility.   [] (87241) Gait Re-education- Provided training and instruction to the patient for proper LE, core and proximal hip recruitment and positioning and eccentric body weight control with ambulation re-education including up and down stairs     Home Exercise Program:    [x] (38953) Reviewed/Progressed HEP activities related to strengthening, flexibility, endurance, ROM of core, proximal hip and LE for functional self-care, mobility, lifting and ambulation/stair navigation   [] (78585) Reviewed/Progressed HEP activities related to improving balance, coordination, kinesthetic sense, posture, motor skill, proprioception of core, proximal hip and LE for self-care, mobility, lifting, and ambulation/stair navigation      Manual Treatments:  PROM / STM / Oscillations-Mobs:  G-I, II, III, IV (PA's, Inf., Post.)  [x] (60342) Provided manual therapy to mobilize LE, proximal hip and/or LS spine soft tissue/joints for the purpose of modulating pain, promoting relaxation, increasing ROM, reducing/eliminating soft tissue swelling/inflammation/restriction, improving soft tissue extensibility and allowing for proper ROM for normal function with self-care, mobility, lifting and ambulation. Modalities:     [] GAME READY (VASO)- for significant edema, swelling, pain control.      Time in:730    Time out: 823      Charges:  Timed Code Treatment Minutes: 53   Total Treatment Minutes:  53   BWC:  TE TIME:  NMR TIME:  MANUAL TIME:   TA  UNTIMED MINUTES:  Medicare Total:   30  23              [] EVAL (LOW) 29901 (typically 20 minutes face-to-face)  [] EVAL (MOD) 73406 (typically 30 minutes face-to-face)  [] EVAL (HIGH) 73844 (typically 45 minutes face-to-face)  [] RE-EVAL     [x] EI(42166) 2     [] IONTO  [x] NMR (68117) 2     [] VASO  [] Manual (52426)      [] Dry Needle:  [] TA x      [] Mech Traction (59394)  [] ES(attended) (89675) [] ES (un) (75502):        GOALS:     Patient stated goal: Improve mobility, return to work.      Therapist goals for Patient:   Short Term Goals: To be achieved in: 2 weeks  1. Independent in HEP and progression per patient tolerance, in order to prevent re-injury. []? Progressing: [x]? Met: []? Not Met: []? Adjusted      2. Patient will have a decrease in pain of NRPS to <2/10 facilitate improvement in movement, function, and ADLs as indicated by Functional Deficits. []? Progressing: [x]? Met: []? Not Met: []? Adjusted      Long Term Goals: To be achieved in: 8 weeks  1. Pt will improve FOTO to 60 or more, indicating improved functional capacity. []? Progressing: [x]? Met: []? Not Met: []? Adjusted       2. Patient will demonstrate increased AROM to hip flex/knee flexion to 110/115 to allow for proper joint functioning as indicated by patients Functional Deficits. []? Progressing: [x]? Met: []? Not Met: []? Adjusted      3. Patient will demonstrate an increase in Strength to hip flexion  to 5/5 allow for proper functional mobility as indicated by patients Functional Deficits. [x]? Progressing: []? Met: []? Not Met: []? Adjusted      4. Patient will return to functional activities with NRPS of 0/10. [x]? Progressing: []? Met: []? Not Met: []? Adjusted      5. Pt will be able to ambulate stairs reciprocally (patient specific functional goal)    []? Progressing: [x]? Met: []? Not Met: []? Adjusted                ASSESSMENT:  Iris Achilles session well. Patient received education on their current pathology and how their condition effects them with their functional activities. Patient understood discussion and questions were answered. Patient understands their activity limitations and understands rational for treatment progression. Pt educated on plan of care and HEP, if worsening symptoms to d/c that exercise.            PLAN: See eval  [x] Continue per plan of care [] Alter current plan (see comments above)  [] Plan of care initiated [] Hold pending MD visit [] Discharge      Electronically signed by:  Apoorva Horn, PT, MHI, ATC    Note: If patient does not return for scheduled/ recommended follow up visits, this note will serve as a discharge from care along with most recent update on progress.

## 2022-07-14 ENCOUNTER — HOSPITAL ENCOUNTER (OUTPATIENT)
Dept: PHYSICAL THERAPY | Age: 67
Setting detail: THERAPIES SERIES
Discharge: HOME | End: 2022-07-14
Payer: COMMERCIAL

## 2022-07-14 PROCEDURE — 97110 THERAPEUTIC EXERCISES: CPT

## 2022-07-14 PROCEDURE — 97112 NEUROMUSCULAR REEDUCATION: CPT

## 2022-07-14 NOTE — FLOWSHEET NOTE
[address] [place] [address] and Sports Rehabilitation[address], [address] [place] [address] [address]  Phone: [phone]   Fax:     [phone]      Physical Therapy Treatment Note/ Progress Report:           Date:  2022    Patient Name:  Amianh John    :  1955  MRN: [id number]  Restrictions/Precautions:    Medical/Treatment Diagnosis Information:   · Diagnosis: Left hip fracture Z87.81  · Treatment Diagnosis: Left hip pain, decreased ROm and strength  Insurance/Certification information:  PT Insurance Information: Workers comp, 12 visit thru   Physician Information[de-identified] Nathanael Fry  Has the plan of care been signed (Y/N):        []  Yes  []  No     Date of Patient follow up with Physician:     Assessment Summary: Jay Sterling is a 73 y.o. male reporting to OP PT s/p left hip ORIF on 22 which occurred after fall at work. Pt is noted to have reduced hip and knee ROM. Moderate reduction in hip flexion strength. Still ambulating with FWW at 50% WB. Is this a Progress Report:     []  Yes  [x]  No        If Yes:  Date Range for reporting period:  Beginning   22  Ending 22    Progress report will be due (10 Rx or 30 days whichever is less):        Recertification will be due (POC Duration  / 90 days whichever is less): 22          Visit # Insurance Allowable Auth Required   In Person  12 thru   12 - []  Yes     []  No    Tele Health   []  Yes     []  No    Total             Functional Scale: FOTO 37, 60, 67    Date assessed:       Latex Allergy:  [x]NO      []YES  Preferred Language for Healthcare:   [x]English       []other:      Pain level:  0/10     SUBJECTIVE:  Pt states hip feels good.            OBJECTIVE:     Knee flexion 120    Hip flexion 110      RESTRICTIONS/PRECAUTIONS: 50% WB     Exercises/Interventions: HEP code: [id number]  Therapeutic Ex (00566) HEP 22    Warm-up        Rec bike  10' lvl 6 10', Lv 6 10', Lv 6            TABLE        Adduction isometric x -- x30     SL CS x -- --     SL Reverse CS  x30 x30     Bridge x x30 x30     Figure 4 stretch        Glenn stretch  --                              STANDING        Wedge gastroc stretch  1' 1' 1'    Heel raises on 1/2 roll  x30 x30 x30    Toe raises on 1/2 roll  x30 x30 x30    Leg Press DL  x20 145# X20, 145# X20, 150#    Leg Press SL  x20 85# X20, 85# X20, 90#    HS curl machine  x30 60#  X30, 65#    Knee extension machine  x30 60#  X30, 65#    Tandem standing        Anterior step ups  x20 6\"      Lateral step ups        Anterior step down  x20 6\"      Rotational step up  x20 6\"  x20 B, 6\"    BOSU balance  1'      BOSU squats  x15 x20 x20    BOSU marches        BOSU step ups  X 20 B x30 B x30 B    BOSU lateral lunge    x20 B    Retrowalk CC  X15, 65#      Gait training - long strides        BITA Extension  x20 B 60#  x20 B, 75#    BITA Abduction  x20 B 60#  x20 B, 75#    BITA Flexion  x20 B 45#  x20 B, 60#    Resisted side steps  2 laps, OTB x8 laps, OTB 10'x3 laps, GTB    Monster walks  2 laps, OTB x2 laps, OTB 10'x2 laps, GTB    Sled  -- x8 laps x10 laps    SL RDL    x10 B              Manual (24353): Therapeutic Exercise and NMR EXR  [x] (91154) Provided verbal/tactile cueing for activities related to strengthening, flexibility, endurance, ROM for improvements in LE, proximal hip, and core control with self care, mobility, lifting, ambulation. [x] (03032) Provided verbal/tactile cueing for activities related to improving balance, coordination, kinesthetic sense, posture, motor skill, proprioception to assist with LE, proximal hip, and core control in self-care, mobility, lifting, ambulation and eccentric single leg control.      NMR and Therapeutic Activities:    [x] (39173 or 81358) Provided verbal/tactile cueing for activities related to improving balance, coordination, kinesthetic sense, posture, motor skill, proprioception and motor activation to allow for proper function of core, proximal hip and LE with self-care and ADLs and functional mobility.   [] (15318) Gait Re-education- Provided training and instruction to the patient for proper LE, core and proximal hip recruitment and positioning and eccentric body weight control with ambulation re-education including up and down stairs     Home Exercise Program:    [x] (24852) Reviewed/Progressed HEP activities related to strengthening, flexibility, endurance, ROM of core, proximal hip and LE for functional self-care, mobility, lifting and ambulation/stair navigation   [] (87170) Reviewed/Progressed HEP activities related to improving balance, coordination, kinesthetic sense, posture, motor skill, proprioception of core, proximal hip and LE for self-care, mobility, lifting, and ambulation/stair navigation      Manual Treatments:  PROM / STM / Oscillations-Mobs:  G-I, II, III, IV (PA's, Inf., Post.)  [x] (54962) Provided manual therapy to mobilize LE, proximal hip and/or LS spine soft tissue/joints for the purpose of modulating pain, promoting relaxation, increasing ROM, reducing/eliminating soft tissue swelling/inflammation/restriction, improving soft tissue extensibility and allowing for proper ROM for normal function with self-care, mobility, lifting and ambulation. Modalities:     [] GAME READY (VASO)- for significant edema, swelling, pain control.      Time in: 845    Time out: 938      Charges:  Timed Code Treatment Minutes: 53   Total Treatment Minutes:  53   BWC:  TE TIME:  NMR TIME:  MANUAL TIME:   TA  UNTIMED MINUTES:  Medicare Total:   30  23              [] EVAL (LOW) 76169 (typically 20 minutes face-to-face)  [] EVAL (MOD) 46784 (typically 30 minutes face-to-face)  [] EVAL (HIGH) 93282 (typically 45 minutes face-to-face)  [] RE-EVAL     [x] FH(80115) 2     [] IONTO  [x] NMR (75590) 2     [] VASO  [] Manual (55936)      [] Dry Needle:  [] TA x      [] Mech Traction (38029)  [] ES(attended) (22610) [] ES (un) (43499):        GOALS:     Patient stated goal: Improve mobility, return to work.      Therapist goals for Patient:   Short Term Goals: To be achieved in: 2 weeks  1. Independent in HEP and progression per patient tolerance, in order to prevent re-injury. []? Progressing: [x]? Met: []? Not Met: []? Adjusted      2. Patient will have a decrease in pain of NRPS to <2/10 facilitate improvement in movement, function, and ADLs as indicated by Functional Deficits. []? Progressing: [x]? Met: []? Not Met: []? Adjusted      Long Term Goals: To be achieved in: 8 weeks  1. Pt will improve FOTO to 60 or more, indicating improved functional capacity. []? Progressing: [x]? Met: []? Not Met: []? Adjusted       2. Patient will demonstrate increased AROM to hip flex/knee flexion to 110/115 to allow for proper joint functioning as indicated by patients Functional Deficits. []? Progressing: [x]? Met: []? Not Met: []? Adjusted      3. Patient will demonstrate an increase in Strength to hip flexion  to 5/5 allow for proper functional mobility as indicated by patients Functional Deficits. [x]? Progressing: []? Met: []? Not Met: []? Adjusted      4. Patient will return to functional activities with NRPS of 0/10. [x]? Progressing: []? Met: []? Not Met: []? Adjusted      5. Pt will be able to ambulate stairs reciprocally (patient specific functional goal)    []? Progressing: [x]? Met: []? Not Met: []? Adjusted                ASSESSMENT:  Deepak Perry session well. Patient received education on their current pathology and how their condition effects them with their functional activities. Patient understood discussion and questions were answered. Patient understands their activity limitations and understands rational for treatment progression. Pt educated on plan of care and HEP, if worsening symptoms to d/c that exercise.            PLAN: See thaddeus  [x] Continue per plan of care [] Alter current plan (see

## 2022-07-19 ENCOUNTER — HOSPITAL ENCOUNTER (OUTPATIENT)
Dept: PHYSICAL THERAPY | Age: 67
Setting detail: THERAPIES SERIES
Discharge: HOME | End: 2022-07-19
Payer: COMMERCIAL

## 2022-07-19 PROCEDURE — 97112 NEUROMUSCULAR REEDUCATION: CPT

## 2022-07-19 PROCEDURE — 97110 THERAPEUTIC EXERCISES: CPT

## 2022-07-19 NOTE — FLOWSHEET NOTE
Rec bike  10' lvl 6 10', Lv 6 10', Lv 6            TABLE        Adduction isometric x -- x30     SL CS x -- --     SL Reverse CS  x30 x30     Bridge x x30 x30     Figure 4 stretch        Glenn stretch  --                              STANDING        Wedge gastroc stretch  1' 1' 1'    Heel raises on 1/2 roll  x30 x30 x30    Toe raises on 1/2 roll  x30 x30 x30    Leg Press DL  x20 145# X20, 145# X20, 150#    Leg Press SL  x20 85# X20, 85# X20, 90#    HS curl machine  x30 60#  X30, 65#    Knee extension machine  x30 60#  X30, 65#    Tandem standing        Anterior step ups  x20 6\"      Lateral step ups        Anterior step down  x20 6\"      Rotational step up  x20 6\"  x20 B, 6\"    BOSU balance  1'      BOSU squats  x15 x20 x20    BOSU marches        BOSU step ups  X 20 B x30 B x30 B    BOSU lateral lunge    x20 B    Retrowalk CC  X15, 65#  X10, 65#    Gait training - long strides        BITA Extension  x20 B 60#  x20 B, 75#    BITA Abduction  x20 B 60#  x20 B, 75#    BITA Flexion  x20 B 45#  x20 B, 60#    Resisted side steps  2 laps, OTB x8 laps, OTB 10'x3 laps, GTB    Monster walks  2 laps, OTB x2 laps, OTB 10'x2 laps, GTB    Sled  -- x8 laps ---    SL RDL    10x2 B              Manual (15911): Therapeutic Exercise and NMR EXR  [x] (74039) Provided verbal/tactile cueing for activities related to strengthening, flexibility, endurance, ROM for improvements in LE, proximal hip, and core control with self care, mobility, lifting, ambulation. [x] (35128) Provided verbal/tactile cueing for activities related to improving balance, coordination, kinesthetic sense, posture, motor skill, proprioception to assist with LE, proximal hip, and core control in self-care, mobility, lifting, ambulation and eccentric single leg control.      NMR and Therapeutic Activities:    [x] (16246 or 59891) Provided verbal/tactile cueing for activities related to improving balance, coordination, kinesthetic sense, posture, motor skill, proprioception and motor activation to allow for proper function of core, proximal hip and LE with self-care and ADLs and functional mobility.   [] (41914) Gait Re-education- Provided training and instruction to the patient for proper LE, core and proximal hip recruitment and positioning and eccentric body weight control with ambulation re-education including up and down stairs     Home Exercise Program:    [x] (20288) Reviewed/Progressed HEP activities related to strengthening, flexibility, endurance, ROM of core, proximal hip and LE for functional self-care, mobility, lifting and ambulation/stair navigation   [] (44318) Reviewed/Progressed HEP activities related to improving balance, coordination, kinesthetic sense, posture, motor skill, proprioception of core, proximal hip and LE for self-care, mobility, lifting, and ambulation/stair navigation      Manual Treatments:  PROM / STM / Oscillations-Mobs:  G-I, II, III, IV (PA's, Inf., Post.)  [x] (30556) Provided manual therapy to mobilize LE, proximal hip and/or LS spine soft tissue/joints for the purpose of modulating pain, promoting relaxation, increasing ROM, reducing/eliminating soft tissue swelling/inflammation/restriction, improving soft tissue extensibility and allowing for proper ROM for normal function with self-care, mobility, lifting and ambulation. Modalities:     [] GAME READY (VASO)- for significant edema, swelling, pain control.      Time in: 735    Time out: 828      Charges:  Timed Code Treatment Minutes: 53   Total Treatment Minutes:  53   BWC:  TE TIME:  NMR TIME:  MANUAL TIME:   TA  UNTIMED MINUTES:  Medicare Total:   30  23              [] EVAL (LOW) 57827 (typically 20 minutes face-to-face)  [] EVAL (MOD) 68568 (typically 30 minutes face-to-face)  [] EVAL (HIGH) 00577 (typically 45 minutes face-to-face)  [] RE-EVAL     [x] IB(19735) 2     [] IONTO  [x] NMR (24024) 2     [] VASO  [] Manual (57101)      [] Dry Needle:  [] TA x      [] [facility] Traction (38704)  [] ES(attended) (19293)      [] ES (un) (78890):        GOALS:     Patient stated goal: Improve mobility, return to work. Therapist goals for Patient:   Short Term Goals: To be achieved in: 2 weeks  1. Independent in HEP and progression per patient tolerance, in order to prevent re-injury. [] Progressing: [x] Met: [] Not Met: [] Adjusted      2. Patient will have a decrease in pain of NRPS to <2/10 facilitate improvement in movement, function, and ADLs as indicated by Functional Deficits. [] Progressing: [x] Met: [] Not Met: [] Adjusted      Long Term Goals: To be achieved in: 8 weeks  1. Pt will improve FOTO to 60 or more, indicating improved functional capacity. [] Progressing: [x] Met: [] Not Met: [] Adjusted       2. Patient will demonstrate increased AROM to hip flex/knee flexion to 110/115 to allow for proper joint functioning as indicated by patients Functional Deficits. [] Progressing: [x] Met: [] Not Met: [] Adjusted      3. Patient will demonstrate an increase in Strength to hip flexion  to 5/5 allow for proper functional mobility as indicated by patients Functional Deficits. [] Progressing: [x] Met: [] Not Met: [] Adjusted      4. Patient will return to functional activities with NRPS of 0/10. [] Progressing: [x] Met: [] Not Met: [] Adjusted      5. Pt will be able to ambulate stairs reciprocally (patient specific functional goal)    [] Progressing: [x] Met: [] Not Met: [] Adjusted                ASSESSMENT:  Jay Sterling has been seen in PT for 23 visits for left hip ORIF. Pt has responded well overall to PT session which have been addressing progress ROM and strength. ROM has improved significantly; hip flexion and IR and knee flexion have made nice improvements. Strength is full. Mild gait deviation remains but no significant functional limitations.              Patient received education on their current pathology and how their condition effects them with their functional activities. Patient understood discussion and questions were answered. Patient understands their activity limitations and understands rational for treatment progression. Pt educated on plan of care and HEP, if worsening symptoms to d/c that exercise. PLAN: See eval  [x] Continue per plan of care [] Alter current plan (see comments above)  [] Plan of care initiated [] Hold pending MD visit [] Discharge      Electronically signed by:  Abdiel Rodriguez, PT, MHI, ATC    Note: If patient does not return for scheduled/ recommended follow up visits, this note will serve as a discharge from care along with most recent update on progress.

## 2022-07-19 NOTE — PROGRESS NOTES
[address] [facility], [address] [place] [address] [address]  Phone: [phone]   Fax: [phone]    Date: 2022          Patient Name; :  Glenn Gonzales; 1955   Dx: Diagnosis: Left hip fracture Z87.81      Physician[de-identified] Masoud Calloway        Total PT Visits: 18     Measures Previous Current   Pain (0-10)     Disability % 37 67     Knee flexion 120     Hip flexion 110    Hip IR 20    Assessment:  Maggi More has been seen in PT for 23 visits for left hip ORIF. Pt has responded well overall to PT session which have been addressing progress ROM and strength. ROM has improved significantly; hip flexion and IR and knee flexion have made nice improvements. Strength is full. Mild gait deviation remains but no significant functional limitations. Prognosis for POC: [x] Good [] Fair  [] Poor      Patient requires continued skilled intervention: [] Yes  [] No        Plan & Recommendations:  [] Continue rehabilitation due to objective improvement and continued functional deficits with frequency and duration:   [] Progress toward  []GAP, []Work Conditioning, []Independent HEP   [x] Discharge due to   [] All goals achieved, [] Maximized \"medical necessity\" [] No subjective or objective improvements      Electronically signed by:  Rachel Brunner PT  Therapy Plan of Care Re-Certification  This patient has been re-evaluated for physical therapy services and for therapy to continue, Medicare, Medicaid and other insurances require periodic physician review of the treatment plan.  Please review the above re-evaluation and verify that you agree with plan of care as established above by signing the attached document and return it to our office or note changes to established plan below  [] Follow treatment plan as above [] Discontinue physical therapy  [] Change plan to: __________________________________________________    Physician Signature:____________________________________ Date:____________  By signing above, therapists plan is approved by physician    If you have any questions or concerns, please don't hesitate to call.   Thank you for your referral.

## 2022-07-22 ENCOUNTER — APPOINTMENT (OUTPATIENT)
Dept: PHYSICAL THERAPY | Age: 67
End: 2022-07-22
Payer: COMMERCIAL

## 2022-07-26 ENCOUNTER — APPOINTMENT (OUTPATIENT)
Dept: PHYSICAL THERAPY | Age: 67
End: 2022-07-26
Payer: COMMERCIAL

## 2022-07-29 ENCOUNTER — APPOINTMENT (OUTPATIENT)
Dept: PHYSICAL THERAPY | Age: 67
End: 2022-07-29
Payer: COMMERCIAL

## 2022-12-30 ENCOUNTER — APPOINTMENT (OUTPATIENT)
Dept: CT IMAGING | Age: 67
End: 2022-12-30
Payer: MEDICARE

## 2022-12-30 ENCOUNTER — HOSPITAL ENCOUNTER (EMERGENCY)
Age: 67
Discharge: HOME | End: 2022-12-30
Payer: MEDICARE

## 2022-12-30 VITALS
RESPIRATION RATE: 18 BRPM | WEIGHT: 210 LBS | SYSTOLIC BLOOD PRESSURE: 141 MMHG | TEMPERATURE: 98 F | BODY MASS INDEX: 28.48 KG/M2 | HEART RATE: 60 BPM | DIASTOLIC BLOOD PRESSURE: 95 MMHG | OXYGEN SATURATION: 97 %

## 2022-12-30 DIAGNOSIS — R93.3 ABNORMAL CT SCAN, COLON: ICD-10-CM

## 2022-12-30 DIAGNOSIS — N28.1 RENAL CYST: ICD-10-CM

## 2022-12-30 DIAGNOSIS — R74.8 ELEVATED LIVER ENZYMES: Primary | ICD-10-CM

## 2022-12-30 DIAGNOSIS — K80.50 BILIARY COLIC: ICD-10-CM

## 2022-12-30 DIAGNOSIS — K80.00 CALCULUS OF GALLBLADDER WITH ACUTE CHOLECYSTITIS WITHOUT OBSTRUCTION: ICD-10-CM

## 2022-12-30 DIAGNOSIS — R91.1 PULMONARY NODULE: ICD-10-CM

## 2022-12-30 LAB
A/G RATIO: 1.5 (ref 1.1–2.2)
ALBUMIN SERPL-MCNC: 4.3 G/DL (ref 3.4–5)
ALP BLD-CCNC: 143 U/L (ref 40–129)
ALT SERPL-CCNC: 194 U/L (ref 10–40)
ANION GAP SERPL CALCULATED.3IONS-SCNC: 8 MMOL/L (ref 3–16)
AST SERPL-CCNC: 198 U/L (ref 15–37)
BASOPHILS ABSOLUTE: 0.1 K/UL (ref 0–0.2)
BASOPHILS RELATIVE PERCENT: 1 %
BILIRUB SERPL-MCNC: 1.3 MG/DL (ref 0–1)
BILIRUBIN URINE: NEGATIVE
BLOOD, URINE: NEGATIVE
BUN BLDV-MCNC: 18 MG/DL (ref 7–20)
CALCIUM SERPL-MCNC: 10.2 MG/DL (ref 8.3–10.6)
CHLORIDE BLD-SCNC: 102 MMOL/L (ref 99–110)
CLARITY: CLEAR
CO2: 26 MMOL/L (ref 21–32)
COLOR: YELLOW
CREAT SERPL-MCNC: 0.8 MG/DL (ref 0.8–1.3)
EKG ATRIAL RATE: 76 BPM
EKG DIAGNOSIS: NORMAL
EKG P AXIS: 41 DEGREES
EKG P-R INTERVAL: 188 MS
EKG Q-T INTERVAL: 382 MS
EKG QRS DURATION: 94 MS
EKG QTC CALCULATION (BAZETT): 429 MS
EKG R AXIS: -36 DEGREES
EKG T AXIS: 17 DEGREES
EKG VENTRICULAR RATE: 76 BPM
EOSINOPHILS ABSOLUTE: 0.1 K/UL (ref 0–0.6)
EOSINOPHILS RELATIVE PERCENT: 1.9 %
GFR SERPL CREATININE-BSD FRML MDRD: >60 ML/MIN/{1.73_M2}
GLUCOSE BLD-MCNC: 110 MG/DL (ref 70–99)
GLUCOSE URINE: NEGATIVE MG/DL
HCT VFR BLD CALC: 43.3 % (ref 40.5–52.5)
HEMOGLOBIN: 14.7 G/DL (ref 13.5–17.5)
KETONES, URINE: NEGATIVE MG/DL
LACTIC ACID: 0.9 MMOL/L (ref 0.4–2)
LEUKOCYTE ESTERASE, URINE: NEGATIVE
LIPASE: 33 U/L (ref 13–60)
LYMPHOCYTES ABSOLUTE: 1.1 K/UL (ref 1–5.1)
LYMPHOCYTES RELATIVE PERCENT: 20.4 %
MCH RBC QN AUTO: 28.8 PG (ref 26–34)
MCHC RBC AUTO-ENTMCNC: 34.1 G/DL (ref 31–36)
MCV RBC AUTO: 84.6 FL (ref 80–100)
MICROSCOPIC EXAMINATION: NORMAL
MONOCYTES ABSOLUTE: 0.4 K/UL (ref 0–1.3)
MONOCYTES RELATIVE PERCENT: 8.3 %
NEUTROPHILS ABSOLUTE: 3.6 K/UL (ref 1.7–7.7)
NEUTROPHILS RELATIVE PERCENT: 68.4 %
NITRITE, URINE: NEGATIVE
PDW BLD-RTO: 15.1 % (ref 12.4–15.4)
PH UA: 5.5 (ref 5–8)
PLATELET # BLD: 236 K/UL (ref 135–450)
PMV BLD AUTO: 7.3 FL (ref 5–10.5)
POTASSIUM REFLEX MAGNESIUM: 4.3 MMOL/L (ref 3.5–5.1)
PROTEIN UA: NEGATIVE MG/DL
RBC # BLD: 5.12 M/UL (ref 4.2–5.9)
SODIUM BLD-SCNC: 136 MMOL/L (ref 136–145)
SPECIFIC GRAVITY UA: 1.01 (ref 1–1.03)
TOTAL PROTEIN: 7.2 G/DL (ref 6.4–8.2)
TROPONIN: <0.01 NG/ML
URINE REFLEX TO CULTURE: NORMAL
URINE TYPE: NORMAL
UROBILINOGEN, URINE: 1 E.U./DL
WBC # BLD: 5.2 K/UL (ref 4–11)

## 2022-12-30 PROCEDURE — 81003 URINALYSIS AUTO W/O SCOPE: CPT

## 2022-12-30 PROCEDURE — 6370000000 HC RX 637 (ALT 250 FOR IP): Performed by: PHYSICIAN ASSISTANT

## 2022-12-30 PROCEDURE — 85025 COMPLETE CBC W/AUTO DIFF WBC: CPT

## 2022-12-30 PROCEDURE — 6360000004 HC RX CONTRAST MEDICATION: Performed by: PHYSICIAN ASSISTANT

## 2022-12-30 PROCEDURE — 99285 EMERGENCY DEPT VISIT HI MDM: CPT

## 2022-12-30 PROCEDURE — 74177 CT ABD & PELVIS W/CONTRAST: CPT

## 2022-12-30 PROCEDURE — 93010 ELECTROCARDIOGRAM REPORT: CPT | Performed by: INTERNAL MEDICINE

## 2022-12-30 PROCEDURE — 80053 COMPREHEN METABOLIC PANEL: CPT

## 2022-12-30 PROCEDURE — 83605 ASSAY OF LACTIC ACID: CPT

## 2022-12-30 PROCEDURE — 84484 ASSAY OF TROPONIN QUANT: CPT

## 2022-12-30 PROCEDURE — 93005 ELECTROCARDIOGRAM TRACING: CPT | Performed by: PHYSICIAN ASSISTANT

## 2022-12-30 PROCEDURE — 83690 ASSAY OF LIPASE: CPT

## 2022-12-30 RX ORDER — OXYCODONE HYDROCHLORIDE AND ACETAMINOPHEN 5; 325 MG/1; MG/1
2 TABLET ORAL ONCE
Status: COMPLETED | OUTPATIENT
Start: 2022-12-30 | End: 2022-12-30

## 2022-12-30 RX ORDER — AMOXICILLIN AND CLAVULANATE POTASSIUM 875; 125 MG/1; MG/1
1 TABLET, FILM COATED ORAL ONCE
Status: COMPLETED | OUTPATIENT
Start: 2022-12-30 | End: 2022-12-30

## 2022-12-30 RX ORDER — AMOXICILLIN AND CLAVULANATE POTASSIUM 875; 125 MG/1; MG/1
1 TABLET, FILM COATED ORAL 2 TIMES DAILY
Qty: 20 TABLET | Refills: 0 | Status: SHIPPED | OUTPATIENT
Start: 2022-12-30 | End: 2023-01-09

## 2022-12-30 RX ADMIN — IOPAMIDOL 75 ML: 755 INJECTION, SOLUTION INTRAVENOUS at 12:00

## 2022-12-30 RX ADMIN — AMOXICILLIN AND CLAVULANATE POTASSIUM 1 TABLET: 875; 125 TABLET, FILM COATED ORAL at 13:37

## 2022-12-30 RX ADMIN — OXYCODONE HYDROCHLORIDE AND ACETAMINOPHEN 2 TABLET: 5; 325 TABLET ORAL at 11:30

## 2022-12-30 ASSESSMENT — PAIN DESCRIPTION - ORIENTATION
ORIENTATION: MID;UPPER
ORIENTATION: MID;UPPER

## 2022-12-30 ASSESSMENT — PAIN DESCRIPTION - DESCRIPTORS
DESCRIPTORS: BURNING
DESCRIPTORS: BURNING

## 2022-12-30 ASSESSMENT — PAIN - FUNCTIONAL ASSESSMENT
PAIN_FUNCTIONAL_ASSESSMENT: 0-10
PAIN_FUNCTIONAL_ASSESSMENT: 0-10

## 2022-12-30 ASSESSMENT — PAIN DESCRIPTION - LOCATION
LOCATION: ABDOMEN
LOCATION: ABDOMEN

## 2022-12-30 ASSESSMENT — PAIN SCALES - GENERAL
PAINLEVEL_OUTOF10: 6
PAINLEVEL_OUTOF10: 0

## 2022-12-30 NOTE — ED PROVIDER NOTES
[address] ED  EMERGENCY DEPARTMENT ENCOUNTER        Pt Name: Amrida Brand  MRN: [id number]  [place] 1955  Date of evaluation: 12/30/2022  Provider: ANTONIO Wilkins  PCP: Regine Lee MD  Note Started: 9:59 AM EST 12/30/22      TAB. I have evaluated this patient. My supervising physician was available for consultation. CHIEF COMPLAINT       Chief Complaint   Patient presents with    Abdominal Pain     Bloating since yesterday, pain in mid epigastric area, pt reports he thinks this is his gallbladder, pain worse after eating, last BM aprx 2 hours ago, pt reports normal BM for him        HISTORY OF PRESENT ILLNESS: 1 or more Elements     History from : Patient    Limitations to history : None    Armida Brand is a 78 y.o. male with past medical history of coronary artery disease with history of anterior MI on 9/15/2015 with MYRIAM to LAD on that date, negative PAD seen 2/10/2017, history of prior ischemic cardiomyopathy resolved with normal EF on echocardiogram 11/10/2015 history of hyperlipidemia, who presents via private vehicle from his home for evaluation of epigastric and right upper quadrant abdominal pain. Patient notes that last night after eating [employer] he felt very bloated and he was burping a lot. He notes that he started with a burning, achy like pain to the epigastrium and right upper quadrant that is been constant since it started it kept him up at night. He denies any nausea vomiting or radiation of the pain. He denies any dysuria hematuria frequency urgency melena hematochezia diarrhea. His last bowel movement was prior to arrival and it was normal for him. He denies any chest pain or shortness of breath difficulty breathing he notes that this pain is not similar to how he felt when he had his MI. He has not been able to take any medicine for his pain. He did take his Prilosec without any improvement.     Nursing Notes were all reviewed and agreed with or any disagreements were addressed in the HPI. REVIEW OF SYSTEMS :      Review of Systems    Positives and Pertinent negatives as per HPI. SURGICAL HISTORY     Past Surgical History:   Procedure Laterality Date    APPENDECTOMY      CARDIAC SURGERY  09/15/2015    coronary stent    FRACTURE SURGERY      nose    SHOULDER ARTHROSCOPY  12/7/2012    left rotator cuff repair, bicep tenodesis, debridement       CURRENTMEDICATIONS       Previous Medications    ASPIRIN 81 MG TABLET    Take 81 mg by mouth daily    ATORVASTATIN (LIPITOR) 80 MG TABLET    Take 80 mg by mouth daily    EZETIMIBE (ZETIA) 10 MG TABLET    Take 10 mg by mouth daily    NAPROXEN SODIUM (ANAPROX) 220 MG TABLET    Take 220 mg by mouth 2 times daily (with meals)    OMEPRAZOLE MAGNESIUM (PRILOSEC OTC PO)    Take  by mouth daily. ALLERGIES     Patient has no known allergies. FAMILYHISTORY       Family History   Problem Relation Age of Onset    Mult Sclerosis Mother         SOCIAL HISTORY       Social History     Tobacco Use    Smoking status: Never   Substance Use Topics    Alcohol use: Yes     Alcohol/week: 0.0 standard drinks    Drug use: No       SCREENINGS        Roy Coma Scale  Eye Opening: Spontaneous  Best Verbal Response: Oriented  Best Motor Response: Obeys commands  Roy Coma Scale Score: 15                CIWA Assessment  BP: (!) 142/98  Heart Rate: 95           PHYSICAL EXAM  1 or more Elements     ED Triage Vitals [12/30/22 0951]   BP Temp Temp Source Heart Rate Resp SpO2 Height Weight   (!) 142/98 98 °F (36.7 °C) Oral 95 16 95 % -- 210 lb (95.3 kg)       Physical Exam  Vitals and nursing note reviewed. Constitutional:       General: He is not in acute distress. Appearance: He is well-developed. He is not ill-appearing, toxic-appearing or diaphoretic. HENT:      Head: Normocephalic and atraumatic.       Right Ear: External ear normal.      Left Ear: External ear normal.      Nose: Nose normal.      Mouth/Throat: Mouth: Mucous membranes are moist.      Pharynx: Oropharynx is clear. Eyes:      General:         Right eye: No discharge. Left eye: No discharge. Extraocular Movements: Extraocular movements intact. Conjunctiva/sclera: Conjunctivae normal.      Pupils: Pupils are equal, round, and reactive to light. Cardiovascular:      Rate and Rhythm: Normal rate and regular rhythm. Pulses: Normal pulses. Heart sounds: Normal heart sounds. No murmur heard. No friction rub. No gallop. Pulmonary:      Effort: Pulmonary effort is normal. No respiratory distress. Breath sounds: Normal breath sounds. No wheezing or rales. Abdominal:      General: Abdomen is flat. Bowel sounds are normal. There is no distension. Palpations: Abdomen is soft. Tenderness: There is abdominal tenderness in the right upper quadrant and epigastric area. There is no right CVA tenderness, left CVA tenderness, guarding or rebound. Musculoskeletal:      Cervical back: Normal range of motion and neck supple. No rigidity. Lymphadenopathy:      Cervical: No cervical adenopathy. Skin:     General: Skin is warm and dry. Capillary Refill: Capillary refill takes less than 2 seconds. Coloration: Skin is not jaundiced. Neurological:      General: No focal deficit present. Mental Status: He is alert and oriented to person, place, and time. Psychiatric:         Behavior: Behavior normal.           DIAGNOSTIC RESULTS   LABS:    Labs Reviewed - No data to display    When ordered only abnormal lab results are displayed. All other labs were within normal range or not returned as of this dictation. EKG: When ordered, EKG's are interpreted by the Emergency Department Physician in the absence of a cardiologist.  Please see their note for interpretation of EKG.     RADIOLOGY:   Non-plain film images such as CT, Ultrasound and MRI are read by the radiologist. Plain radiographic images are visualized and preliminarily interpreted by the ED Provider with the below findings:        Interpretation per the Radiologist below, if available at the time of this note:    No orders to display     No results found. No results found. PROCEDURES   Unless otherwise noted below, none     Procedures    CRITICAL CARE TIME (.cctime)       PAST MEDICAL HISTORY      has a past medical history of Heart attack ([facility] [address] (2015). Chronic Conditions affecting Care: CAD     EMERGENCY DEPARTMENT COURSE and DIFFERENTIAL DIAGNOSIS/MDM:   Vitals:    Vitals:    12/30/22 0951   BP: (!) 142/98   Pulse: 95   Resp: 16   Temp: 98 °F (36.7 °C)   TempSrc: Oral   SpO2: 95%   Weight: 210 lb (95.3 kg)       Patient was given the following medications:  Medications - No data to display          Is this patient to be included in the SEP-1 Core Measure due to severe sepsis or septic shock? No   Exclusion criteria - the patient is NOT to be included for SEP-1 Core Measure due to:  2+ SIRS criteria are not met    CONSULTS: (Who and What was discussed)  None  Discussion with Other Profesionals : Consultant I discussed the patient's presentation and results with Dr. Yury Leija who agrees with my plan for outpatient management and outpatient follow-up in his office. Records Reviewed : Outpatient Notes cardiology note from [facility] cardiologist 12/5/2022  Stress test 12/12/22: Normal myocardial perfusion scan with no stress induced ischemia. Normal LV function and wall motion. The result of this study is normal  The risk of this study is low  Electronically Signed On 12- 11:26:22 EST by Daniel Dickerson MD     CC/HPI Summary, DDx, ED Course, and Reassessment: Patient seen and evaluated. Old records reviewed. Diagnostic testing reviewed and results discussed. I have independently evaluated this patient based upon my scope of practice. Supervising physician was in the department for consultation as needed.      Patient is a very pleasant 79-year-old male who presents for evaluation of abdominal pain. Patient seen and evaluated by myself, history obtained from patient. Exam is notable for overall well-appearing nontoxic adult male, he has epigastric and right upper quadrant tenderness to palpation he is not jaundiced he otherwise appears well. He had basic work-up which included  Basic blood work, CT abdomen pelvis, he given his cardiac history did have a EKG and troponin although he denies chest pain and notes that this is not similar to pain that he had when he had his heart attack. Urinalysis is negative for sign of infection. CBC is negative for acute leukocytosis. Metabolic panel reviewed it does show new elevation in liver enzymes, this is new compared to outpatient blood work reviewed from earlier this year at outside facility. He had CT abdomen pelvis which is remarkable for mild polyp pericholecystic fluid and mild wall prominence of the gallbladder with a possible tiny gallstone, common bile duct measured to be normal limits. Patient was advised of this finding he notes and instructed that he is going to need to follow-up as an outpatient with general surgery, as he is not febrile has no acute acute leukocytosis and after pain medication department feels completely normal at this time I do not believe that he has an infected gallbladder I believe that he likely just has inflammatory changes. Nevertheless will be sent home with Augmentin and he was given 1 p.o. Augmentin in the department in addition to p.o. narcotic with complete resolution of his pain. CT is also notable for abnormal findings to the transverse colon he has never had a colonoscopy he will be given outpatient referral to gastroenterology instructed that he needs to follow-up and obtain a colonoscopy to rule out that this is any sort of early malignancy.   He has unremarkable renal cyst and pulmonary nodules appreciated as well, he was advised of these results instructed to follow-up with his family doctor regarding these results. At this time I believe patient is reasonable candidate for discharge home with close outpatient follow-up with treating specialist and strict ER return precautions instructed that if he needs pain control that is outside of Tylenol or ibuprofen he needs to return to the ER additionally if he develops fever nausea or vomiting he needs to return. Disposition Considerations (include 1 Tests not done, Shared Decision Making, Pt Expectation of Test or Tx.):   Appropriate for outpatient management      Pt is in agreement with the current plan and all questions were addressed. I am the Primary Clinician of Record. FINAL IMPRESSION    No diagnosis found. DISPOSITION/PLAN     DISPOSITION        PATIENT REFERRED TO:  No follow-up provider specified. DISCHARGE MEDICATIONS:  New Prescriptions    No medications on file       DISCONTINUED MEDICATIONS:  Discontinued Medications    CLOPIDOGREL (PLAVIX) 75 MG TABLET    Take 75 mg by mouth daily    METHYLPREDNISOLONE (MEDROL DOSEPACK) 4 MG TABLET    Take by mouth.               (Please note that portions of this note were completed with a voice recognition program.  Efforts were made to edit the dictations but occasionally words are mis-transcribed.)    Carli Vivas (electronically signed)            Carli Vivas  12/30/22 2772

## 2022-12-30 NOTE — ED PROVIDER NOTES
I did not personally evaluate this patient but I was asked to review the EKG. EKG  The Ekg interpreted by myself in the emergency department in the absence of a cardiologist.  normal sinus rhythm with a rate of 76  Axis is   Left axis deviation  QTc is  within an acceptable range  Intervals and Durations are unremarkable. No specific ST-T wave changes appreciated. No evidence of acute ischemia.    No significant change from prior EKG dated 12/10/2012     Gail Armendariz MD  12/30/22 5857

## 2022-12-30 NOTE — DISCHARGE INSTRUCTIONS
What is the gallbladder? The gallbladder is a small, pear-shaped organ that is tucked under your liver (figure 1). It stores bile, a fluid that helps the body break down fat. What are gallstones? Gallstones are small stones that form inside the gallbladder. They can be tiny specks or get as big as the whole gallbladder, which can be up to 6 inches long. Normally, the gallbladder fills with bile between meals. Then, when you eat fatty foods, the gallbladder empties the bile into the intestine. Sometimes, though, gallstones clog the gallbladder and keep it from draining. Other times, gallstones just irritate the gallbladder. If the gallstones are pushed out of the gallbladder, they can keep the liver or pancreas from draining. What are the symptoms of gallstones? In most cases, gallstones do not cause any symptoms. When they do cause symptoms, gallstones can cause:    ?Belly pain - Often on the right side just under the rib cage or in the middle top portion of the belly. ?Pain in the back or right shoulder    ? Nausea and vomiting    If you know that you have gallstones but have no symptoms, you will probably not need treatment. But if you start having symptoms, you should get treated. The symptoms can come and go, but they often get worse over time. Are gallstones serious? Not usually. In some cases, they can lead to serious problems, including:    ?Jaundice, a condition that turns your skin and eyes yellow    ? Infection of the gallbladder    ? Tears in the gallbladder, which can lead to death    ? Inflammation of the pancreas (the pancreas is an organ that makes hormones and juices involved in food breakdown)    Is there a test for gallstones? Yes, doctors can find out if you have gallstones by doing an imaging test, such as an ultrasound. An ultrasound is a painless test that uses sound waves to create a picture of your gallbladder.     Even if tests show that you have gallstones, that does not mean they are causing symptoms. Your doctor might need to do other tests to make sure your stones and your symptoms are related. How are gallstones treated? People with gallstones generally have 3 treatment options. They can have:    ? No treatment - This option is best for most people who have no symptoms. If they start having symptoms, they can think about treatment then. ?Surgery to remove the gallbladder and the stones - Gallbladder surgery is routine in the [place]. But it involves using anesthesia, so it has some risks. The surgery does not affect digestion very much. But about half of people who have surgery have mild symptoms afterward, including watery bowel movements, gas, or bloating. These symptoms usually get better. ?Treatment to get rid of the stones but keep the gallbladder - People who choose this approach can take medicines to break up gallstones. In rare cases, doctors can use with a device to break up and remove stones. Medicines only work with some stones, and even then they take time - months to years. The stones can also come back after these treatments. How do I know which treatment to have? The right treatment for you will depend on:    ? How large your stones are    ? Whether you have symptoms, and how bad the symptoms are    ? How you feel about the treatment options    Ask your doctor or nurse how each treatment might affect you. Then work with them to find the treatment that makes the most sense for you. Can I do anything to keep from getting (more) gallstones? Yes. You can try to keep yourself at a healthy weight. People who are overweight are more likely to get gallstones. If you plan to lose weight quickly - even if you have never had gallstones - ask your doctor or nurse what you can do to keep from getting gallstones. Losing weight quickly - for example, through weight loss surgery - can lead to gallstones.  But your doctor or nurse can give you medicines to keep that from happening. Lung Nodules    Your x-ray or CT scan shows a nodule (\"spot\") on your lung. This will require follow-up for further evaluation. Please call your Primary Care Physician (PCP) if you have already established one and you confirmed your PCP during your ER visit today. If you do not have a PCP, please call the [address] [place] [address] scheduling number to schedule your Emergency Department follow up visit. Please mention that you are scheduling an \"ER follow up visit\" for a lung nodule. Learning About Lung Nodules  What is a lung nodule? A lung nodule is a growth in the lung. A single nodule surrounded by lung tissue is called a solitary pulmonary nodule. A lung nodule might not cause any symptoms. Your doctor may have found one or more nodules on your lung when you were having a chest X-ray or CT scan. Or it may have been found during a lung cancer screening. A lung nodule may be caused by an old infection or cancer. It might also be a noncancerous growth. Lung nodules can cause a screening to give an abnormal result. Most nodules do not cause any harm. But without further tests, your doctor can't tell whether an abnormal finding is cancer, a harmless nodule, or something else. What can you expect when you have a lung nodule? Your doctor will look at several risk factors to see how likely it is that the nodule is cancer. He or she will look at: Whether you smoke or have ever smoked. Your age and your family's medical history. Whether you have ever had lung cancer. The size and shape of the nodule. Whether the nodule has changed in size. Your doctor may look at past chest X-rays or CT scans, if available, and compare them. Or you may have a series of CT scans to see if the nodule grows over time. What happens next depends on the risk of the nodule being cancer. If you have no risk factors and the nodule is small, your doctor may advise doing nothing.   If the risk is small, your doctor may schedule follow-up appointments and tests. You may have more CT scans later to see if the nodule is growing. If the nodule hasn't changed in 3 to 6 months, you may have CT scans every year. If it hasn't changed in 2 years, you may not need any more tests. If there's a higher risk of cancer, your doctor may:  Do a PET scan, which may help tell if the nodule is cancerous or not. Take a sample of tissue from the nodule for testing. This is called a biopsy. Remove the nodule with surgery. Follow-up care is a key part of your treatment and safety. Be sure to make and go to all appointments, and call your doctor if you are having problems. It's also a good idea to know your test results and keep a list of the medicines you take. What is the next step in evaluation of a lung nodule? Below are the recommended guidelines for management of pulmonary nodules, you can discuss these recommendations at your follow-up appointment:     Nodule size less than or equal to ?4 mm  In a low-risk patient, no follow-up needed. In a high-risk patient, follow-up CT at 12 months; if unchanged, no further follow-up. Nodule size equals >4-6 mm  In a low-risk patient, follow-up CT at 12 months; if unchanged, no further follow-up. In a high-risk patient, initial follow-up CT at 6-12 months then at 18-24 months if no change. Nodule size equals >6-8 mm  In a low-risk patient, initial follow-up CT at 6-12 months then at 18-24 months if no change. In a high-risk patient, initial follow-up CT at 3-6 months then at 9-12 months and 24 months if no change. Nodule size greater than >8 mm  In low-risk and high-risk patients follow-up CT at around 3, 9, and 24 months, contrast-enhanced CT, PET, and/or biopsy.

## 2022-12-30 NOTE — ED NOTES
1304- Called general surgery for consult  1310- Dr. Eleni Sprague returned call, spoke with [facility]  [facility]  12/30/22 1318

## 2023-01-11 ENCOUNTER — INITIAL CONSULT (OUTPATIENT)
Dept: SURGERY | Age: 68
End: 2023-01-11

## 2023-01-11 VITALS
SYSTOLIC BLOOD PRESSURE: 152 MMHG | DIASTOLIC BLOOD PRESSURE: 95 MMHG | HEIGHT: 72 IN | HEART RATE: 71 BPM | WEIGHT: 212 LBS | BODY MASS INDEX: 28.71 KG/M2

## 2023-01-11 DIAGNOSIS — K80.00 ACUTE CALCULOUS CHOLECYSTITIS: Primary | ICD-10-CM

## 2023-01-11 RX ORDER — SODIUM CHLORIDE 0.9 % (FLUSH) 0.9 %
5-40 SYRINGE (ML) INJECTION PRN
OUTPATIENT
Start: 2023-01-11

## 2023-01-11 RX ORDER — SODIUM CHLORIDE 0.9 % (FLUSH) 0.9 %
5-40 SYRINGE (ML) INJECTION EVERY 12 HOURS SCHEDULED
OUTPATIENT
Start: 2023-01-11

## 2023-01-11 RX ORDER — HEPARIN SODIUM 5000 [USP'U]/ML
5000 INJECTION, SOLUTION INTRAVENOUS; SUBCUTANEOUS ONCE
OUTPATIENT
Start: 2023-01-11

## 2023-01-11 RX ORDER — SODIUM CHLORIDE 9 MG/ML
INJECTION, SOLUTION INTRAVENOUS PRN
OUTPATIENT
Start: 2023-01-11

## 2023-02-02 ENCOUNTER — TELEPHONE (OUTPATIENT)
Dept: SURGERY | Age: 68
End: 2023-02-02

## 2023-02-07 ENCOUNTER — TELEPHONE (OUTPATIENT)
Dept: SURGERY | Age: 68
End: 2023-02-07

## 2023-02-07 PROBLEM — H35.81 MACULAR EDEMA OF RIGHT EYE: Status: ACTIVE | Noted: 2022-11-04

## 2023-02-07 PROBLEM — E78.5 HYPERLIPIDEMIA: Status: ACTIVE | Noted: 2022-06-11

## 2023-02-07 PROBLEM — S63.502A WRIST SPRAIN, LEFT, INITIAL ENCOUNTER: Status: ACTIVE | Noted: 2022-05-18

## 2023-02-07 PROBLEM — I21.9 HEART ATTACK (HCC): Status: ACTIVE | Noted: 2023-02-07

## 2023-02-07 PROBLEM — I21.09 ACUTE MYOCARDIAL INFARCTION OF ANTERIOR WALL (HCC): Status: ACTIVE | Noted: 2022-06-11

## 2023-02-07 PROBLEM — S72.002A CLOSED LEFT HIP FRACTURE (HCC): Status: ACTIVE | Noted: 2022-03-31

## 2023-02-07 PROBLEM — S63.399A: Status: ACTIVE | Noted: 2022-08-02

## 2023-02-07 NOTE — TELEPHONE ENCOUNTER
I called Michelle CASTILLO for colonoscopy results, but it is not dictated yet and awaiting path report. They will fax it once it is complete.  What diagnosis should be used for lap cholecystectomy w/ cholangiogram?

## 2023-02-09 DIAGNOSIS — K81.1 CHRONIC CHOLECYSTITIS: ICD-10-CM

## 2023-02-10 ENCOUNTER — ANESTHESIA EVENT (OUTPATIENT)
Dept: OPERATING ROOM | Age: 68
End: 2023-02-10
Payer: MEDICARE

## 2023-02-11 NOTE — PROGRESS NOTES
[facility]    HPI:  Patient is 78y.o. year old male seen at request of Cherelle Arenas MD.  He reports pain in right upper quadrant and midepigastric region. It is pressure-like and sharp. It is described as moderate. Other associated symptoms are bloating/abdominal distension and nausea. These symptoms have been present for  days . They are  made worse by eating. The pain does radiate to the back. He had to go to the ED. Workup showed gallbladder disease and abnormal area of colon. Past Medical History:   Diagnosis Date    Heart attack ([address] 2015       Past Surgical History:   Procedure Laterality Date    APPENDECTOMY      CARDIAC SURGERY  09/15/2015    coronary stent    FRACTURE SURGERY      nose    HAND SURGERY Left 08/02/2022    HIP SURGERY Left 04/01/2022    plates and screws    SHOULDER ARTHROSCOPY  12/07/2012    left rotator cuff repair, bicep tenodesis, debridement       Current Outpatient Medications on File Prior to Visit   Medication Sig Dispense Refill    ezetimibe (ZETIA) 10 MG tablet Take 10 mg by mouth daily      aspirin 81 MG tablet Take 81 mg by mouth daily      atorvastatin (LIPITOR) 80 MG tablet Take 80 mg by mouth daily      Omeprazole Magnesium (PRILOSEC OTC PO) Take  by mouth daily. naproxen sodium (ANAPROX) 220 MG tablet Take 220 mg by mouth 2 times daily (with meals) (Patient not taking: No sig reported)       No current facility-administered medications on file prior to visit.        No Known Allergies    Social History     Socioeconomic History    Marital status:      Spouse name: Not on file    Number of children: Not on file    Years of education: Not on file    Highest education level: Not on file   Occupational History    Not on file   Tobacco Use    Smoking status: Never    Smokeless tobacco: Never   Vaping Use    Vaping Use: Never used   Substance and Sexual Activity    Alcohol use: Not Currently    Drug use: No    Sexual activity: Not on file Other Topics Concern    Not on file   Social History Narrative    Not on file     Social Determinants of Health     Financial Resource Strain: Not on file   Food Insecurity: Not on file   Transportation Needs: Not on file   Physical Activity: Not on file   Stress: Not on file   Social Connections: Not on file   Intimate Partner Violence: Not on file   Housing Stability: Not on file       Family History   Problem Relation Age of Onset    Mult Sclerosis Mother        ROS:  He reports no complaints related to the eyes, ears , nose throat or mouth. He denies weight loss. No chest pain. No SOB. No urinary complaints. No musculoskeletal complaints. No skin rashes. No neurologic deficits. No bleeding tendencies. GI complaints include upper abdominal pain. Physical Exam:  Vitals:    01/11/23 1429   BP: (!) 152/95   Pulse: 71   212#  General:  Comfortable. No distress. Eyes:  No scleral icterus  Ears:  Normal  Nose:  Normal  Mouth:  Mucous membranes moist  Respiratory: Lungs CTA. No accessory muscle use. Heart:  Regular rhythm  Abdomen:  Soft. Non distended. Mild RUQ tenderness. Musculoskeletal:  No abnormal movements. ROM extremities normal.  Skin:  No rashes. Neurologic:  No focal deficits. Psychiatric:  AAA. O x 3.    Radiographic studies:  CT with gallstones and pericholecystic fluid. Mid transverse colon narrowing    Laboratory Studies:   Lab Results   Component Value Date/Time    BILITOT 1.3 12/30/2022 11:18 AM    ALKPHOS 143 12/30/2022 11:18 AM     12/30/2022 11:18 AM     12/30/2022 11:18 AM    LABALBU 4.3 12/30/2022 11:18 AM       ASSESSMENT:  1. Acute calculous cholecystitis      2. Abnormal transverse colon on CT. 3.  Elevated LFT      PLAN:  The diagnosis and recommended procedure were explained. Questions answered. Prepare for gallbladder surgery. Will get cardiac clearance from Dr. Shanon Leon. Will have him get colonoscopy prior to cholecystectomy.     [address] Berry Kothari MD

## 2023-02-15 NOTE — PROGRESS NOTES
PRE OP INSTRUCTION SHEET   1. Do not eat or drink anything after 12 midnight  prior to surgery. This includes no water, chewing gum or mints. 2. Take the following pills will a small sip of water (see MAR)                                        3. Aspirin, Ibuprofen, Advil, Naproxen, Vitamin E, fish oil and other Anti-inflammatory products should be stopped for 5 days before surgery or as directed by your physician. 4. Check with your Doctor regarding stopping Plavix, Coumadin, Lovenox, Fragmin or other blood thinners   5. Do not smoke, and do not drink any alcoholic beverages 24 hours prior to surgery. This includes NA Beer. 6. You may brush your teeth and gargle the morning of surgery. DO NOT SWALLOW WATER   7. You MUST make arrangements for a responsible adult to take you home after your surgery. You will not be allowed to leave alone or drive yourself home. It is strongly suggested someone stay with you the first 24 hrs. Your surgery will be cancelled if you do not have a ride home. 8. A parent/legal guardian must accompany a child scheduled for surgery and plan to stay at the hospital until the child is discharged. Please do not bring other children with you. 9. Please wear simple, loose fitting clothing to the hospital.  Carmita Killian not bring valuables (money, credit cards, checkbooks, etc.) Do not wear any makeup (including no eye makeup) or nail polish on your fingers or toes. 10. DO NOT wear any jewelry or piercings on day of surgery. All body piercing jewelry must be removed. 11. If you have dentures,glasses, or contacts they will be removed before going to the OR; we will provide you a container. 12. Please see your family doctor/and cardiologist for a history & physical and/or concerning medications. Bring any test results/reports from your physician's office. Have history and labs faxed to [phone].  Remember to bring Blood Bank bracelet on the day of surgery. 14. If you have a Living Will and Durable Power of  for Healthcare, please bring in a copy. 13. Notify your Surgeon if you develop any illness between now and surgery  time, cough, cold, fever, sore throat, nausea, vomiting, etc.  Please notify your surgeon if you experience dizziness, shortness of breath or blurred vision between now & the time of your surgery   16. DO NOT shave your operative site 96 hours prior to surgery. For face & neck surgery, men may use an electric razor 48 hours prior to surgery. 17. Shower with _x__Antibacterial soap (x_chlorhexidine for total joint  Pt's) shower two times before surgery.(the morning of and the night before. 18. To provide excellent care visitors will be limited to one in the room at any given time.   Please call pre admission testing if you any further questions [phone]

## 2023-02-16 ENCOUNTER — TELEPHONE (OUTPATIENT)
Dept: SURGERY | Age: 68
End: 2023-02-16

## 2023-02-16 NOTE — TELEPHONE ENCOUNTER
We received colonoscopy report from [facility]. It is scanned into media. He is scheduled for lap amalia w/ cholang on 2/20/23, pending there results from colonoscopy.

## 2023-02-20 ENCOUNTER — HOSPITAL ENCOUNTER (OUTPATIENT)
Age: 68
Setting detail: OUTPATIENT SURGERY
Discharge: HOME | End: 2023-02-20
Attending: SURGERY | Admitting: SURGERY
Payer: MEDICARE

## 2023-02-20 ENCOUNTER — ANESTHESIA (OUTPATIENT)
Dept: OPERATING ROOM | Age: 68
End: 2023-02-20
Payer: MEDICARE

## 2023-02-20 ENCOUNTER — APPOINTMENT (OUTPATIENT)
Dept: GENERAL RADIOLOGY | Age: 68
End: 2023-02-20
Attending: SURGERY
Payer: MEDICARE

## 2023-02-20 VITALS
HEIGHT: 72 IN | TEMPERATURE: 97.8 F | SYSTOLIC BLOOD PRESSURE: 131 MMHG | DIASTOLIC BLOOD PRESSURE: 94 MMHG | BODY MASS INDEX: 28.17 KG/M2 | RESPIRATION RATE: 12 BRPM | HEART RATE: 60 BPM | OXYGEN SATURATION: 94 % | WEIGHT: 208 LBS

## 2023-02-20 DIAGNOSIS — K81.1 CHRONIC CHOLECYSTITIS: ICD-10-CM

## 2023-02-20 LAB
A/G RATIO: 1.6 (ref 1.1–2.2)
ALBUMIN SERPL-MCNC: 4.6 G/DL (ref 3.4–5)
ALP BLD-CCNC: 99 U/L (ref 40–129)
ALT SERPL-CCNC: 23 U/L (ref 10–40)
ANION GAP SERPL CALCULATED.3IONS-SCNC: 12 MMOL/L (ref 3–16)
AST SERPL-CCNC: 23 U/L (ref 15–37)
BILIRUB SERPL-MCNC: 0.5 MG/DL (ref 0–1)
BILIRUBIN DIRECT: <0.2 MG/DL (ref 0–0.3)
BILIRUBIN, INDIRECT: NORMAL MG/DL (ref 0–1)
BUN BLDV-MCNC: 10 MG/DL (ref 7–20)
CALCIUM SERPL-MCNC: 9.2 MG/DL (ref 8.3–10.6)
CHLORIDE BLD-SCNC: 103 MMOL/L (ref 99–110)
CO2: 24 MMOL/L (ref 21–32)
CREAT SERPL-MCNC: 0.7 MG/DL (ref 0.8–1.3)
GFR SERPL CREATININE-BSD FRML MDRD: >60 ML/MIN/{1.73_M2}
GLUCOSE BLD-MCNC: 96 MG/DL (ref 70–99)
POTASSIUM REFLEX MAGNESIUM: 4.1 MMOL/L (ref 3.5–5.1)
SODIUM BLD-SCNC: 139 MMOL/L (ref 136–145)
TOTAL PROTEIN: 7.4 G/DL (ref 6.4–8.2)

## 2023-02-20 PROCEDURE — 2580000003 HC RX 258: Performed by: ANESTHESIOLOGY

## 2023-02-20 PROCEDURE — 7100000010 HC PHASE II RECOVERY - FIRST 15 MIN: Performed by: SURGERY

## 2023-02-20 PROCEDURE — 6360000002 HC RX W HCPCS: Performed by: SURGERY

## 2023-02-20 PROCEDURE — 2580000003 HC RX 258: Performed by: NURSE ANESTHETIST, CERTIFIED REGISTERED

## 2023-02-20 PROCEDURE — 2580000003 HC RX 258: Performed by: SURGERY

## 2023-02-20 PROCEDURE — 47563 LAPARO CHOLECYSTECTOMY/GRAPH: CPT | Performed by: SURGERY

## 2023-02-20 PROCEDURE — 6360000004 HC RX CONTRAST MEDICATION: Performed by: SURGERY

## 2023-02-20 PROCEDURE — 7100000000 HC PACU RECOVERY - FIRST 15 MIN: Performed by: SURGERY

## 2023-02-20 PROCEDURE — 2709999900 HC NON-CHARGEABLE SUPPLY: Performed by: SURGERY

## 2023-02-20 PROCEDURE — A4217 STERILE WATER/SALINE, 500 ML: HCPCS | Performed by: SURGERY

## 2023-02-20 PROCEDURE — 36415 COLL VENOUS BLD VENIPUNCTURE: CPT

## 2023-02-20 PROCEDURE — 3700000000 HC ANESTHESIA ATTENDED CARE: Performed by: SURGERY

## 2023-02-20 PROCEDURE — 6360000002 HC RX W HCPCS: Performed by: NURSE ANESTHETIST, CERTIFIED REGISTERED

## 2023-02-20 PROCEDURE — 80053 COMPREHEN METABOLIC PANEL: CPT

## 2023-02-20 PROCEDURE — 3600000014 HC SURGERY LEVEL 4 ADDTL 15MIN: Performed by: SURGERY

## 2023-02-20 PROCEDURE — 7100000001 HC PACU RECOVERY - ADDTL 15 MIN: Performed by: SURGERY

## 2023-02-20 PROCEDURE — 2500000003 HC RX 250 WO HCPCS: Performed by: SURGERY

## 2023-02-20 PROCEDURE — 88304 TISSUE EXAM BY PATHOLOGIST: CPT

## 2023-02-20 PROCEDURE — 3700000001 HC ADD 15 MINUTES (ANESTHESIA): Performed by: SURGERY

## 2023-02-20 PROCEDURE — 74300 X-RAY BILE DUCTS/PANCREAS: CPT

## 2023-02-20 PROCEDURE — 3600000004 HC SURGERY LEVEL 4 BASE: Performed by: SURGERY

## 2023-02-20 PROCEDURE — 2500000003 HC RX 250 WO HCPCS: Performed by: NURSE ANESTHETIST, CERTIFIED REGISTERED

## 2023-02-20 PROCEDURE — 2500000003 HC RX 250 WO HCPCS: Performed by: ANESTHESIOLOGY

## 2023-02-20 PROCEDURE — 7100000011 HC PHASE II RECOVERY - ADDTL 15 MIN: Performed by: SURGERY

## 2023-02-20 RX ORDER — MEPERIDINE HYDROCHLORIDE 25 MG/ML
12.5 INJECTION INTRAMUSCULAR; INTRAVENOUS; SUBCUTANEOUS EVERY 5 MIN PRN
Status: DISCONTINUED | OUTPATIENT
Start: 2023-02-20 | End: 2023-02-20 | Stop reason: HOSPADM

## 2023-02-20 RX ORDER — HEPARIN SODIUM 5000 [USP'U]/ML
5000 INJECTION, SOLUTION INTRAVENOUS; SUBCUTANEOUS ONCE
Status: COMPLETED | OUTPATIENT
Start: 2023-02-20 | End: 2023-02-20

## 2023-02-20 RX ORDER — SODIUM CHLORIDE 0.9 % (FLUSH) 0.9 %
5-40 SYRINGE (ML) INJECTION EVERY 12 HOURS SCHEDULED
Status: DISCONTINUED | OUTPATIENT
Start: 2023-02-20 | End: 2023-02-20 | Stop reason: HOSPADM

## 2023-02-20 RX ORDER — LIDOCAINE HYDROCHLORIDE 20 MG/ML
INJECTION, SOLUTION INFILTRATION; PERINEURAL PRN
Status: DISCONTINUED | OUTPATIENT
Start: 2023-02-20 | End: 2023-02-20 | Stop reason: SDUPTHER

## 2023-02-20 RX ORDER — ONDANSETRON 2 MG/ML
4 INJECTION INTRAMUSCULAR; INTRAVENOUS
Status: DISCONTINUED | OUTPATIENT
Start: 2023-02-20 | End: 2023-02-20 | Stop reason: HOSPADM

## 2023-02-20 RX ORDER — OXYCODONE HYDROCHLORIDE 5 MG/1
10 TABLET ORAL PRN
Status: DISCONTINUED | OUTPATIENT
Start: 2023-02-20 | End: 2023-02-20 | Stop reason: HOSPADM

## 2023-02-20 RX ORDER — SODIUM CHLORIDE 9 MG/ML
INJECTION, SOLUTION INTRAVENOUS PRN
Status: DISCONTINUED | OUTPATIENT
Start: 2023-02-20 | End: 2023-02-20 | Stop reason: HOSPADM

## 2023-02-20 RX ORDER — HYDROMORPHONE HCL 110MG/55ML
PATIENT CONTROLLED ANALGESIA SYRINGE INTRAVENOUS PRN
Status: DISCONTINUED | OUTPATIENT
Start: 2023-02-20 | End: 2023-02-20 | Stop reason: SDUPTHER

## 2023-02-20 RX ORDER — FAMOTIDINE 10 MG/ML
20 INJECTION, SOLUTION INTRAVENOUS ONCE
Status: COMPLETED | OUTPATIENT
Start: 2023-02-20 | End: 2023-02-20

## 2023-02-20 RX ORDER — FENTANYL CITRATE 50 UG/ML
INJECTION, SOLUTION INTRAMUSCULAR; INTRAVENOUS PRN
Status: DISCONTINUED | OUTPATIENT
Start: 2023-02-20 | End: 2023-02-20 | Stop reason: SDUPTHER

## 2023-02-20 RX ORDER — BUPIVACAINE HYDROCHLORIDE 5 MG/ML
INJECTION, SOLUTION EPIDURAL; INTRACAUDAL PRN
Status: DISCONTINUED | OUTPATIENT
Start: 2023-02-20 | End: 2023-02-20 | Stop reason: ALTCHOICE

## 2023-02-20 RX ORDER — KETOROLAC TROMETHAMINE 30 MG/ML
INJECTION, SOLUTION INTRAMUSCULAR; INTRAVENOUS PRN
Status: DISCONTINUED | OUTPATIENT
Start: 2023-02-20 | End: 2023-02-20 | Stop reason: SDUPTHER

## 2023-02-20 RX ORDER — ROCURONIUM BROMIDE 10 MG/ML
INJECTION, SOLUTION INTRAVENOUS PRN
Status: DISCONTINUED | OUTPATIENT
Start: 2023-02-20 | End: 2023-02-20 | Stop reason: SDUPTHER

## 2023-02-20 RX ORDER — SODIUM CHLORIDE, SODIUM LACTATE, POTASSIUM CHLORIDE, AND CALCIUM CHLORIDE .6; .31; .03; .02 G/100ML; G/100ML; G/100ML; G/100ML
IRRIGANT IRRIGATION PRN
Status: DISCONTINUED | OUTPATIENT
Start: 2023-02-20 | End: 2023-02-20 | Stop reason: ALTCHOICE

## 2023-02-20 RX ORDER — SODIUM CHLORIDE, SODIUM LACTATE, POTASSIUM CHLORIDE, CALCIUM CHLORIDE 600; 310; 30; 20 MG/100ML; MG/100ML; MG/100ML; MG/100ML
INJECTION, SOLUTION INTRAVENOUS CONTINUOUS
Status: DISCONTINUED | OUTPATIENT
Start: 2023-02-20 | End: 2023-02-20 | Stop reason: HOSPADM

## 2023-02-20 RX ORDER — ONDANSETRON 2 MG/ML
INJECTION INTRAMUSCULAR; INTRAVENOUS PRN
Status: DISCONTINUED | OUTPATIENT
Start: 2023-02-20 | End: 2023-02-20 | Stop reason: SDUPTHER

## 2023-02-20 RX ORDER — DEXAMETHASONE SODIUM PHOSPHATE 4 MG/ML
INJECTION, SOLUTION INTRA-ARTICULAR; INTRALESIONAL; INTRAMUSCULAR; INTRAVENOUS; SOFT TISSUE PRN
Status: DISCONTINUED | OUTPATIENT
Start: 2023-02-20 | End: 2023-02-20 | Stop reason: SDUPTHER

## 2023-02-20 RX ORDER — OXYCODONE HYDROCHLORIDE 5 MG/1
5 TABLET ORAL EVERY 6 HOURS PRN
Qty: 24 TABLET | Refills: 0 | Status: SHIPPED | OUTPATIENT
Start: 2023-02-20 | End: 2023-02-27

## 2023-02-20 RX ORDER — MAGNESIUM HYDROXIDE 1200 MG/15ML
LIQUID ORAL CONTINUOUS PRN
Status: COMPLETED | OUTPATIENT
Start: 2023-02-20 | End: 2023-02-20

## 2023-02-20 RX ORDER — SODIUM CHLORIDE 9 MG/ML
25 INJECTION, SOLUTION INTRAVENOUS PRN
Status: DISCONTINUED | OUTPATIENT
Start: 2023-02-20 | End: 2023-02-20 | Stop reason: HOSPADM

## 2023-02-20 RX ORDER — SODIUM CHLORIDE 0.9 % (FLUSH) 0.9 %
5-40 SYRINGE (ML) INJECTION PRN
Status: DISCONTINUED | OUTPATIENT
Start: 2023-02-20 | End: 2023-02-20 | Stop reason: HOSPADM

## 2023-02-20 RX ORDER — SODIUM CHLORIDE, SODIUM LACTATE, POTASSIUM CHLORIDE, CALCIUM CHLORIDE 600; 310; 30; 20 MG/100ML; MG/100ML; MG/100ML; MG/100ML
INJECTION, SOLUTION INTRAVENOUS CONTINUOUS PRN
Status: DISCONTINUED | OUTPATIENT
Start: 2023-02-20 | End: 2023-02-20 | Stop reason: SDUPTHER

## 2023-02-20 RX ORDER — SODIUM CHLORIDE 9 MG/ML
INJECTION, SOLUTION INTRAVENOUS
Status: DISCONTINUED
Start: 2023-02-20 | End: 2023-02-20 | Stop reason: HOSPADM

## 2023-02-20 RX ORDER — OXYCODONE HYDROCHLORIDE 5 MG/1
5 TABLET ORAL PRN
Status: DISCONTINUED | OUTPATIENT
Start: 2023-02-20 | End: 2023-02-20 | Stop reason: HOSPADM

## 2023-02-20 RX ORDER — PROPOFOL 10 MG/ML
INJECTION, EMULSION INTRAVENOUS PRN
Status: DISCONTINUED | OUTPATIENT
Start: 2023-02-20 | End: 2023-02-20 | Stop reason: SDUPTHER

## 2023-02-20 RX ADMIN — ROCURONIUM BROMIDE 50 MG: 10 INJECTION, SOLUTION INTRAVENOUS at 07:37

## 2023-02-20 RX ADMIN — PROPOFOL 180 MG: 10 INJECTION, EMULSION INTRAVENOUS at 07:37

## 2023-02-20 RX ADMIN — FENTANYL CITRATE 100 MCG: 50 INJECTION INTRAMUSCULAR; INTRAVENOUS at 07:35

## 2023-02-20 RX ADMIN — SUGAMMADEX 200 MG: 100 INJECTION, SOLUTION INTRAVENOUS at 08:32

## 2023-02-20 RX ADMIN — DEXAMETHASONE SODIUM PHOSPHATE 4 MG: 4 INJECTION, SOLUTION INTRAMUSCULAR; INTRAVENOUS at 07:37

## 2023-02-20 RX ADMIN — ONDANSETRON HYDROCHLORIDE 4 MG: 2 INJECTION, SOLUTION INTRAMUSCULAR; INTRAVENOUS at 08:29

## 2023-02-20 RX ADMIN — KETOROLAC TROMETHAMINE 30 MG: 30 INJECTION, SOLUTION INTRAMUSCULAR at 08:29

## 2023-02-20 RX ADMIN — FAMOTIDINE 20 MG: 10 INJECTION, SOLUTION INTRAVENOUS at 06:55

## 2023-02-20 RX ADMIN — SODIUM CHLORIDE, POTASSIUM CHLORIDE, SODIUM LACTATE AND CALCIUM CHLORIDE: 600; 310; 30; 20 INJECTION, SOLUTION INTRAVENOUS at 06:56

## 2023-02-20 RX ADMIN — VANCOMYCIN HYDROCHLORIDE 1000 MG: 1 INJECTION, POWDER, LYOPHILIZED, FOR SOLUTION INTRAVENOUS at 06:57

## 2023-02-20 RX ADMIN — SODIUM CHLORIDE, POTASSIUM CHLORIDE, SODIUM LACTATE AND CALCIUM CHLORIDE: 600; 310; 30; 20 INJECTION, SOLUTION INTRAVENOUS at 07:30

## 2023-02-20 RX ADMIN — HYDROMORPHONE HYDROCHLORIDE 0.8 MG: 2 INJECTION, SOLUTION INTRAMUSCULAR; INTRAVENOUS; SUBCUTANEOUS at 07:59

## 2023-02-20 RX ADMIN — ROCURONIUM BROMIDE 10 MG: 10 INJECTION, SOLUTION INTRAVENOUS at 08:10

## 2023-02-20 RX ADMIN — HEPARIN SODIUM 5000 UNITS: 5000 INJECTION INTRAVENOUS; SUBCUTANEOUS at 06:49

## 2023-02-20 RX ADMIN — SODIUM CHLORIDE, POTASSIUM CHLORIDE, SODIUM LACTATE AND CALCIUM CHLORIDE: 600; 310; 30; 20 INJECTION, SOLUTION INTRAVENOUS at 08:12

## 2023-02-20 RX ADMIN — LIDOCAINE HYDROCHLORIDE 100 MG: 20 INJECTION, SOLUTION INFILTRATION; PERINEURAL at 07:37

## 2023-02-20 ASSESSMENT — ENCOUNTER SYMPTOMS: SHORTNESS OF BREATH: 0

## 2023-02-20 ASSESSMENT — LIFESTYLE VARIABLES: SMOKING_STATUS: 0

## 2023-02-20 ASSESSMENT — PAIN - FUNCTIONAL ASSESSMENT: PAIN_FUNCTIONAL_ASSESSMENT: 0-10

## 2023-02-20 NOTE — ANESTHESIA PRE PROCEDURE
Department of Anesthesiology  Preprocedure Note       Name:  Dominick Ortega   Age:  78 y.o.  :  1955                                          MRN:  [id number]         Date:  2023      Surgeon: Colby Austin):  Alicia Hannah MD    Procedure: Procedure(s):  LAPAROSCOPIC CHOLECYSTECTOMY, WITH CHOLANGIOGRAMS, POSSIBLE CONVENTIONAL CHOLECYSTECTOMY    Medications prior to admission:   Prior to Admission medications    Medication Sig Start Date End Date Taking? Authorizing Provider   ezetimibe (ZETIA) 10 MG tablet Take 10 mg by mouth daily 3/8/21   Historical Provider, MD   aspirin 81 MG tablet Take 81 mg by mouth daily    Historical Provider, MD   atorvastatin (LIPITOR) 80 MG tablet Take 80 mg by mouth daily    Historical Provider, MD   Omeprazole Magnesium (PRILOSEC OTC PO) Take  by mouth daily.       Historical Provider, MD       Current medications:    Current Facility-Administered Medications   Medication Dose Route Frequency Provider Last Rate Last Admin    famotidine (PEPCID) injection 20 mg  20 mg IntraVENous Once Shaneka Infante MD        lactated ringers IV soln infusion   IntraVENous Continuous Shaneka Infante MD        sodium chloride flush 0.9 % injection 5-40 mL  5-40 mL IntraVENous 2 times per day Shaneka Infante MD        sodium chloride flush 0.9 % injection 5-40 mL  5-40 mL IntraVENous PRN Shaneka Infante MD        0.9 % sodium chloride infusion   IntraVENous PRN Shaneka Infante MD        heparin (porcine) injection 5,000 Units  5,000 Units SubCUTAneous Once Alicia Hannah MD        vancomycin 1000 mg IVPB in 250 mL NS addavial  1,000 mg IntraVENous Once Alicia Hannah MD           Allergies:  No Known Allergies    Problem List:    Patient Active Problem List   Diagnosis Code    Acute myocardial infarction of anterior wall (HCC) I21.09    Arteriosclerosis of coronary artery I25.10    Closed left hip fracture ([facility] [address] S72.002A  Gastroesophageal reflux disease K21.9    Hyperlipidemia E78.5    Macular edema of right eye H35.81    Presence of drug coated stent in anterior descending branch of left coronary artery Z95.5    Pure hypercholesterolemia E78.00    Stented coronary artery Z95.5    Traumatic rupture of scapholunate ligament 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.399A    Wrist sprain, left, initial encounter S63.502A    Chronic cholecystitis K81.1       Past Medical History:        Diagnosis Date    Heart attack ([facility] [address] 2015       Past Surgical History:        Procedure Laterality Date    APPENDECTOMY      CARDIAC SURGERY  09/15/2015    coronary stent    FRACTURE SURGERY      nose    HAND SURGERY Left 08/02/2022    HIP SURGERY Left 04/01/2022    plates and screws    SHOULDER ARTHROSCOPY  12/07/2012    left rotator cuff repair, bicep tenodesis, debridement       Social History:    Social History     Tobacco Use    Smoking status: Never    Smokeless tobacco: Never   Substance Use Topics    Alcohol use: Not Currently                                Counseling given: Not Answered      Vital Signs (Current):   Vitals:    02/15/23 1005   Weight: 208 lb (94.3 kg)   Height: 6' (1.829 m)                                              BP Readings from Last 3 Encounters:   01/11/23 (!) 152/95   12/30/22 (!) 141/95   10/10/17 124/77       NPO Status:  MN+, SEE MAR                                                                               BMI:   Wt Readings from Last 3 Encounters:   02/15/23 208 lb (94.3 kg)   01/11/23 212 lb (96.2 kg)   12/30/22 210 lb (95.3 kg)     Body mass index is 28.21 kg/m².     CBC:   Lab Results   Component Value Date/Time    WBC 5.2 12/30/2022 11:18 AM    RBC 5.12 12/30/2022 11:18 AM    HGB 14.7 12/30/2022 11:18 AM    HCT 43.3 12/30/2022 11:18 AM    MCV 84.6 12/30/2022 11:18 AM    RDW 15.1 12/30/2022 11:18 AM     12/30/2022 11:18 AM       CMP:   Lab Results   Component Value Date/Time     12/30/2022 11:18 AM    K 4.3 12/30/2022 11:18 AM     12/30/2022 11:18 AM    CO2 26 12/30/2022 11:18 AM    BUN 18 12/30/2022 11:18 AM    CREATININE 0.8 12/30/2022 11:18 AM    AGRATIO 1.5 12/30/2022 11:18 AM    LABGLOM >60 12/30/2022 11:18 AM    GLUCOSE 110 12/30/2022 11:18 AM    PROT 7.2 12/30/2022 11:18 AM    CALCIUM 10.2 12/30/2022 11:18 AM    BILITOT 1.3 12/30/2022 11:18 AM    ALKPHOS 143 12/30/2022 11:18 AM     12/30/2022 11:18 AM     12/30/2022 11:18 AM       POC Tests: No results for input(s): POCGLU, POCNA, POCK, POCCL, POCBUN, POCHEMO, POCHCT in the last 72 hours. Coags: No results found for: PROTIME, INR, APTT    HCG (If Applicable): No results found for: PREGTESTUR, PREGSERUM, HCG, HCGQUANT     ABGs: No results found for: PHART, PO2ART, NQA1AAK, BNW9VTY, BEART, L8LFKPIB     Type & Screen (If Applicable):  No results found for: LABABO, LABRH    Drug/Infectious Status (If Applicable):  No results found for: HIV, HEPCAB    COVID-19 Screening (If Applicable):   Lab Results   Component Value Date/Time    COVID19 Not Detected 01/31/2022 03:12 PM           Anesthesia Evaluation  Patient summary reviewed no history of anesthetic complications:   Airway: Mallampati: II  TM distance: >3 FB   Neck ROM: full  Mouth opening: > = 3 FB   Dental:          Pulmonary:Negative Pulmonary ROS breath sounds clear to auscultation      (-) COPD, asthma, shortness of breath, recent URI, sleep apnea and not a current smoker          Patient did not smoke on day of surgery.                  Cardiovascular:    (+) past MI: > 6 months, CAD: obstructive, CABG/stent (STENT, 2015):, hyperlipidemia    (-) pacemaker, hypertension, dysrhythmias,  angina and  CHF    ECG reviewed  Rhythm: regular  Rate: normal           Beta Blocker:  Not on Beta Blocker         Neuro/Psych:   Negative Neuro/Psych ROS     (-) seizures, TIA, CVA and psychiatric history           GI/Hepatic/Renal:   (+) GERD: well controlled,      (-) liver disease, no renal disease, bowel prep and no morbid obesity       Endo/Other:    (+) : arthritis:., no malignancy/cancer. (-) diabetes mellitus, hypothyroidism, hyperthyroidism, blood dyscrasia, no malignancy/cancer               Abdominal:       Abdomen: soft. Vascular: negative vascular ROS. Other Findings:           Anesthesia Plan      general     ASA 2       Induction: intravenous. MIPS: Postoperative opioids intended and Prophylactic antiemetics administered. Anesthetic plan and risks discussed with patient. Plan discussed with CRNA. This pre-anesthesia assessment may be used as a history and physical.    DOS STAFF ADDENDUM:    Pt seen and examined, chart reviewed (including anesthesia, drug and allergy history). No interval changes to history and physical examination. Anesthetic plan, risks, benefits, alternatives, and personnel involved discussed with patient. Patient verbalized an understanding and agrees to proceed.       Jagdish Barclay MD  February 20, 2023  6:22 AM      Jagdish Barclay MD   2/20/2023

## 2023-02-20 NOTE — H&P
Department of General Surgery - Adult  Surgical Service   Attending History and Physical        CHIEF COMPLAINT:  RUQ pain      History Obtained From:  patient    HISTORY OF PRESENT ILLNESS:    This patient is a 78 y.o. male who presents with need for gallbladder surgery. Past Medical History:        Diagnosis Date    Heart attack ([address] 2015     Past Surgical History:        Procedure Laterality Date    APPENDECTOMY      CARDIAC SURGERY  09/15/2015    coronary stent    FRACTURE SURGERY      nose    HAND SURGERY Left 08/02/2022    HIP SURGERY Left 04/01/2022    plates and screws    SHOULDER ARTHROSCOPY  12/07/2012    left rotator cuff repair, bicep tenodesis, debridement     Current Medications:  Current Facility-Administered Medications   Medication Dose Route Frequency Provider Last Rate Last Admin    lactated ringers IV soln infusion   IntraVENous Continuous Iris Gonsalez  mL/hr at 02/20/23 0656 New Bag at 02/20/23 0656    sodium chloride flush 0.9 % injection 5-40 mL  5-40 mL IntraVENous 2 times per day Iris Gonsalez MD        sodium chloride flush 0.9 % injection 5-40 mL  5-40 mL IntraVENous PRN Iris Gonsalez MD        0.9 % sodium chloride infusion   IntraVENous PRN Iris Gonsalez MD        vancomycin 1000 mg IVPB in 250 mL NS addavial  1,000 mg IntraVENous Once Diana Joyce  mL/hr at 02/20/23 0657 1,000 mg at 02/20/23 0657     Home Medications:  Prior to Admission medications    Medication Sig Start Date End Date Taking? Authorizing Provider   ezetimibe (ZETIA) 10 MG tablet Take 10 mg by mouth daily 3/8/21   Historical Provider, MD   aspirin 81 MG tablet Take 81 mg by mouth daily    Historical Provider, MD   atorvastatin (LIPITOR) 80 MG tablet Take 80 mg by mouth daily    Historical Provider, MD   Omeprazole Magnesium (PRILOSEC OTC PO) Take  by mouth daily. Historical Provider, MD     Allergies:  Patient has no known allergies.       Social History:   TOBACCO:   reports that he has never smoked. He has never used smokeless tobacco.  ETOH:   reports that he does not currently use alcohol. Family History:       Problem Relation Age of Onset    Mult Sclerosis Mother      REVIEW OF SYSTEMS:    Patient reports no complaints related to eyes, ears, nose , throat or mouth. No chest pain or SOB. No urinary complaints or musculoskeletal complaints. No skin rashes, bleeding tendencies. Current GI complaints  RUQ pain. PHYSICAL EXAM:    VITALS:  BP (!) 142/93   Pulse 67   Temp 98.4 °F (36.9 °C) (Temporal)   Resp 14   Ht 6' (1.829 m)   Wt 208 lb (94.3 kg)   SpO2 95%   BMI 28.21 kg/m²   Comfortable  Eyes:  No scleral icterus  Mouth:  Mucus membranes moist  Skin:  No rashes  Chest:  CTA  Heart:  Regular  Abdomen:Soft. Tender RUQ. Extremities:  No edema  Neurologic:  No focal deficits  Psychiatric : AAA O x 3      DATA:  GBUS with stones and pericholecystic fluid    ASSESSMENT:   Acute calculous cholecystitis    Plan:    The diagnosis and recommended procedure were explained. Questions answered. Prepare for gallbladder surgery.

## 2023-02-20 NOTE — PROGRESS NOTES
Pt to PACU placed on bedside monitor. NSR 62, SPO2 93% ob 2L O2 via NC w/ CSPO2 monitoring. Resp e/e unlabored. ABD soft, dressings x4 CDI, steri strips x1 CDI. RN to bedside. Phase I (PACU)  1. Patient is identified using name and the date of birth. 2.  The patient is free from signs and symptoms of chemical, electrical, laser, radiation, positioning, or transfer/transport injury. 3.  The patient receives appropriate medication(s), safely administered during the Perioperative period. 4.  The patient has wound/tissue perfusion consistent with or improved from baseline levels established preoperatively. 5.  The patient is at or returning to normothermia at the conclusion of the immediate postoperative period. 6.  The patient's fluid, electrolyte, and acid base balances are consistent with or improved from baseline levels established preoperatively. 7.  The patient's pulmonary function is consistent with or improved from baseline levels established preoperatively. 8.  The patient's cardiovascular status is consistent with or improved from baseline levels established preoperatively. 9.  The patient/caregiver participates in decisions affecting his or her Perioperative plan of care. 10. The patient's care is consistent with the individualized Perioperative plan of care. 11. The patient's right to privacy is maintained. 12. The patient is the recipient of competent and ethical care within legal standards of practice. 13.  The patient's value system, lifestyle, ethnicity, and culture are considered, respected, and incorporated in the Perioperative plan of care. 14.  The patient demonstrates and/or reports adequate pain control throughout the the Perioperative period. 15. The patient's neurological status is consistent with or improved from baseline levels established preoperatively. 16.  The patient/caregiver demonstrates knowledge of the expected responses to the operative or invasive procedure.   17. Patient/Caregiver has reduced anxiety. Interventions- Familiarize with environment and equipment.   18.  Other:  19.Other:

## 2023-02-20 NOTE — PROGRESS NOTES
Discharge instructions explained to pt and pt partner Maritza Jon. Pt and Maritza Jon received copy of discharge instructions. Pt  and Maritza Jon deny having any questions about discharge. IV removed per discharge hemostasis achieved, dressing applied, no complications noted. Patient denies further needs. Pt transported to private car via wheel chair. Vitals per doc flow, Maritza Jon assumes responsibility.

## 2023-02-20 NOTE — BRIEF OP NOTE
Brief Postoperative Note      Patient: Milon Hamman  YOB: 1955  MRN: [id number]    Date of Procedure: 2/20/2023    Pre-Op Diagnosis: Chronic cholecystitis [K81.1]    Post-Op Diagnosis: Same       Procedure(s):  LAPAROSCOPIC CHOLECYSTECTOMY WITH CHOLANGIOGRAMS    Surgeon(s):  Dolores Galicia MD    Assistant:  Surgical Assistant: Siomara Lau    Anesthesia: General    Estimated Blood Loss (mL): Minimal    Complications: None    Specimens:   ID Type Source Tests Collected by Time Destination   A : gallbladder and contents   Tissue Gallbladder SURGICAL PATHOLOGY Dolores Galicia MD 2/20/2023 3675        Implants:  * No implants in log *      Drains: * No LDAs found *    Findings: As above    Electronically signed by Stefano Frank MD on 2/20/2023 at 8:34 AM

## 2023-02-20 NOTE — DISCHARGE INSTRUCTIONS
[facility] SURGERY [facility] AND [facility]. Mee Douglass M.D. [facility] [address] [place]                [address] Sheela Lemons M.D. [address] [place] [address]         [address] Radha aGrcia M.D                         [phone]        [facility] Florian Velazquez M.D. [address] [facility]       POST-OPERATIVE INSTRUCTIONS FOR GALLBLADDER SURGERY    Call the office to schedule your post-operative appointment with your surgeon for two (2) weeks. You will have either white steri-strips and a water occlusive dressing or surgical glue closing your incisions. If you have clear bandages over your incisions, you may remove them in 2 days. Leave the steri-stips in place. These will peel away in 7-10 days. You may shower in 2 days after removing your dressing. Wash incisions gently, and pat them dry. Do not rub your incisions. General guidelines for activity:   Avoid strenuous activity or lifting anything heavier than 15 pounds. It is OK to be up  walking around, and walking up and down stairs. Do what is comfortable: stop and rest when you feel tired. Drink plenty of fluids and stay on a bland diet for 2-3 days after surgery. Do NOT drive while taking your narcotic pain medicine. You may resume driving when you feel capable of responding to an urgent situation if needed and not taking prescription pain medication. Watch for signs of infection:  Excessive warmth or bright redness around your incisions  Leakage of bloody or cloudy fluid from you incisions  Fever over 100.5  During the laparoscopic procedure that you had, gas is pumped into the abdominal cavity. You may feel abdominal, shoulder, or rib pain for a few days due to this gas.     You will have pain medicine ordered. Take as directed    If you experience constipation:  Increase your water intake  Increase your activity, walking is best.  A stool softener or mild laxative may be necessary if you still have not had a bowel movement ; call the office for further instructions.

## 2023-02-20 NOTE — ANESTHESIA POSTPROCEDURE EVALUATION
Department of Anesthesiology  Postprocedure Note    Patient: Nisa Vasques  MRN: [id number]  YOB: 1955  Date of evaluation: 2/20/2023      Procedure Summary     Date: 02/20/23 Room / Location: [facility]    Anesthesia Start: 3995 Anesthesia Stop: 3914    Procedure: LAPAROSCOPIC CHOLECYSTECTOMY WITH CHOLANGIOGRAMS Diagnosis:       Chronic cholecystitis      (Chronic cholecystitis [K81.1])    Surgeons: Terrell Solis MD Responsible Provider: Adonis Barillas MD    Anesthesia Type: general ASA Status: 2          Anesthesia Type: No value filed.     Larry Phase I: Larry Score: 10    Larry Phase II: Larry Score: 10    Vitals:    02/20/23 0900 02/20/23 0905 02/20/23 0915 02/20/23 0920   BP: (!) 141/86 (!) 151/98 (!) 131/94    Pulse: 53 51 51 60   Resp: 13 10 12    Temp:       TempSrc:       SpO2: 96% 94% 94%    Weight:       Height:         Anesthesia Post Evaluation    Patient location during evaluation: bedside  Patient participation: complete - patient participated  Level of consciousness: awake and alert  Airway patency: patent  Nausea & Vomiting: no nausea  Complications: no  Cardiovascular status: hemodynamically stable  Respiratory status: acceptable  Hydration status: euvolemic

## 2023-02-23 NOTE — OP NOTE
[address]                 [address] [facility]                                OPERATIVE REPORT    PATIENT NAME: Mickie Bravo                    :        1955  MED REC NO:   [id number]                          ROOM:  ACCOUNT NO:   [de-identified]                           ADMIT DATE: 2023  PROVIDER:     Rossy Friedman MD    DATE OF PROCEDURE:  2023    PREOPERATIVE DIAGNOSIS:  Acute calculous cholecystitis. POSTOPERATIVE DIAGNOSIS:  Acute calculous cholecystitis. OPERATION PERFORMED:  Laparoscopic cholecystectomy with intraoperative  cholangiogram.    SURGEON:  Rossy Friedman MD    ANESTHESIA:  General.    COMPLICATIONS:  None. ESTIMATED BLOOD LOSS:  Less than 50 mL. INDICATIONS FOR THE OPERATION:  A 64-year-old male with recurring  episodes of epigastric and right upper quadrant pain. Imaging showed  gallstones as well as pericholecystic fluid. I recommended operative  intervention. The risks and benefits were explained. The patient  understood them, accepted them, and elected to proceed. DESCRIPTION OF OPERATION:  The patient was brought to the operating  room. General anesthesia was induced. He was prepped and draped in  usual surgical sterile fashion. A vertical supraumbilical incision was  made. A Veress needle was inserted. Pneumoperitoneum was established. A disposable 5-mm trocar was passed through the incision. Laparoscope  was inserted. Under direct vision, an 11-mm port was placed in the  epigastrium and two 5-mm ports in the right upper quadrant. We had  adequate visualization and retraction. There was some edema and  thickening of the gallbladder wall. I identified the cystic duct as it  tapered into the gallbladder. Three clips were placed away from the  gallbladder, one clip next to gallbladder, and the cystic duct was  divided.   Cystic artery had two clips placed proximally and one clip  distally. It was divided. Posterior branch of the artery was clipped  as well. The gallbladder was dissected from the gallbladder fossa of  the liver bed. Gallbladder was brought out through the epigastric  incision. I reinspected the right upper quadrant. I copiously  irrigated the area. I suctioned out the irrigant. There was no evident  bleeding, bile leak, or complication. I deflated the abdomen and  removed the trocars. The fascia at the epigastric port site was  reapproximated with 0 Vicryl suture. Local anesthetic was infiltrated. 4-0 Vicryl was used to reapproximate the skin at all the incisions. Benzoin and Steri-Strip dressings were placed. DISPOSITION:  The patient tolerated the procedure without any acute  complication.         Volodymyr Lundberg MD    D: 02/23/2023 7:47:57       T: 02/23/2023 7:51:41     [email or web address]  Job#: [id number]     Doc#: [id number]    CC:  Vianey Oliveros MD

## 2023-03-06 ENCOUNTER — OFFICE VISIT (OUTPATIENT)
Dept: SURGERY | Age: 68
End: 2023-03-06

## 2023-03-06 VITALS
HEIGHT: 72 IN | WEIGHT: 212 LBS | HEART RATE: 88 BPM | DIASTOLIC BLOOD PRESSURE: 90 MMHG | SYSTOLIC BLOOD PRESSURE: 129 MMHG | BODY MASS INDEX: 28.71 KG/M2

## 2023-03-06 DIAGNOSIS — Z09 SURGICAL FOLLOW-UP CARE: Primary | ICD-10-CM

## 2023-03-06 PROCEDURE — 99024 POSTOP FOLLOW-UP VISIT: CPT | Performed by: SURGERY

## 2023-04-06 NOTE — PROGRESS NOTES
[facility] GENERAL SURGERY      S:   Patient presents s/p lap amalia 2 weeks  ago. He reports improvement. O:   Comfortable         Incision sites healing well. FINAL DIAGNOSIS:     Gallbladder, cholecystectomy:      - Chronic cholecystitis and cholelithiasis. - A small benign pericystic lymph node. - No neoplasm. PHIAB/PHIAB               A:   S/P above    P:   Follow up as needed.

## 2023-11-20 ENCOUNTER — OFFICE VISIT (OUTPATIENT)
Dept: PRIMARY CARE CLINIC | Age: 68
End: 2023-11-20
Payer: COMMERCIAL

## 2023-11-20 VITALS
BODY MASS INDEX: 29.05 KG/M2 | OXYGEN SATURATION: 98 % | DIASTOLIC BLOOD PRESSURE: 62 MMHG | SYSTOLIC BLOOD PRESSURE: 118 MMHG | HEART RATE: 76 BPM | RESPIRATION RATE: 14 BRPM | TEMPERATURE: 98.2 F | WEIGHT: 214.2 LBS

## 2023-11-20 DIAGNOSIS — J22 LOWER RESPIRATORY INFECTION: Primary | ICD-10-CM

## 2023-11-20 DIAGNOSIS — R09.82 POST-NASAL DRAINAGE: ICD-10-CM

## 2023-11-20 PROBLEM — I10 PRIMARY HYPERTENSION: Status: ACTIVE | Noted: 2023-02-28

## 2023-11-20 PROCEDURE — 3078F DIAST BP <80 MM HG: CPT

## 2023-11-20 PROCEDURE — 3074F SYST BP LT 130 MM HG: CPT

## 2023-11-20 PROCEDURE — 1123F ACP DISCUSS/DSCN MKR DOCD: CPT

## 2023-11-20 PROCEDURE — 99213 OFFICE O/P EST LOW 20 MIN: CPT

## 2023-11-20 RX ORDER — LOSARTAN POTASSIUM 100 MG/1
100 TABLET ORAL DAILY
COMMUNITY
Start: 2023-04-28

## 2023-11-20 RX ORDER — BENZONATATE 200 MG/1
200 CAPSULE ORAL 3 TIMES DAILY PRN
Qty: 30 CAPSULE | Refills: 0 | Status: SHIPPED | OUTPATIENT
Start: 2023-11-20 | End: 2023-11-30

## 2023-11-20 RX ORDER — AZITHROMYCIN 250 MG/1
250 TABLET, FILM COATED ORAL SEE ADMIN INSTRUCTIONS
Qty: 6 TABLET | Refills: 0 | Status: SHIPPED | OUTPATIENT
Start: 2023-11-20 | End: 2023-11-25

## 2023-11-20 RX ORDER — NITROGLYCERIN 0.4 MG/1
0.4 TABLET SUBLINGUAL PRN
COMMUNITY
Start: 2023-01-04

## 2023-11-20 RX ORDER — FLUTICASONE PROPIONATE 50 MCG
1 SPRAY, SUSPENSION (ML) NASAL DAILY
Qty: 32 G | Refills: 1 | Status: SHIPPED | OUTPATIENT
Start: 2023-11-20

## 2023-11-20 RX ORDER — METHYLPREDNISOLONE 4 MG/1
TABLET ORAL
Qty: 1 KIT | Refills: 0 | Status: SHIPPED | OUTPATIENT
Start: 2023-11-20

## 2023-11-20 ASSESSMENT — ENCOUNTER SYMPTOMS
SINUS PAIN: 0
SHORTNESS OF BREATH: 0
SORE THROAT: 1
SINUS PRESSURE: 1
COUGH: 1
WHEEZING: 0
CHEST TIGHTNESS: 0
GASTROINTESTINAL NEGATIVE: 1

## 2023-11-20 NOTE — PROGRESS NOTES
800 2023    Emmanuel Avalos (:  1955) is a 80 y.o. male, here for evaluation of the following medical concerns:    Chief Complaint   Patient presents with    Pharyngitis    Cough    Congestion        ASSESSMENT/ PLAN  1. Lower respiratory infection  - azithromycin (ZITHROMAX) 250 MG tablet; Take 1 tablet by mouth See Admin Instructions for 5 days 500mg on day 1 followed by 250mg on days 2 - 5  Dispense: 6 tablet; Refill: 0  - methylPREDNISolone (MEDROL DOSEPACK) 4 MG tablet; Take by mouth. Dispense: 1 kit; Refill: 0  - benzonatate (TESSALON) 200 MG capsule; Take 1 capsule by mouth 3 times daily as needed for Cough  Dispense: 30 capsule; Refill: 0    2. Post-nasal drainage  - fluticasone (FLONASE) 50 MCG/ACT nasal spray; 1 spray by Each Nostril route daily  Dispense: 32 g; Refill: 1       - Supportive care measures discussed. - He cannot take decongestants due to cardiac history and BP medication. Return if symptoms worsen or fail to improve. HPI  Here today with respiratory symptoms. Wife works at [employer]. I saw her last week for similar symptoms. His PCP is Dr. Arvind Moura at [facility]. He has a pre-op scheduled for this Wednesday for his cataract surgery. Symptoms started last week with a sore throat. Then, went to his right ear. Symptoms getting worse. +sinus congestion/pressure. States that it was running a little bit this morning. Clear drainage. +cough; non-productive. Denies any chest tightness, wheezing, SOB, etc. Has kept him up the past 2 nights.   +sore throat  Denies fever/chills. No body aches. Denies N/V/D. Appetite is slightly decreased. Drinking fluids and urinating normally. Taking Aleve for his sore throat. Also tried Delsym. Didn't much last night. States that his spouse his similar symptoms. Hx tonsillectomy. ROS  Review of Systems   Constitutional:  Positive for appetite change and fatigue.  Negative for chills

## 2024-02-20 PROBLEM — I25.2 HISTORY OF ACUTE MYOCARDIAL INFARCTION: Status: ACTIVE | Noted: 2022-06-11

## 2024-08-29 ENCOUNTER — HOSPITAL ENCOUNTER (OUTPATIENT)
Dept: GENERAL RADIOLOGY | Age: 69
Discharge: HOME | End: 2024-08-29
Payer: MEDICARE

## 2024-08-29 ENCOUNTER — OFFICE VISIT (OUTPATIENT)
Dept: PRIMARY CARE CLINIC | Age: 69
End: 2024-08-29
Payer: MEDICARE

## 2024-08-29 ENCOUNTER — HOSPITAL ENCOUNTER (OUTPATIENT)
Age: 69
Discharge: HOME | End: 2024-08-29
Payer: MEDICARE

## 2024-08-29 VITALS
BODY MASS INDEX: 27.4 KG/M2 | OXYGEN SATURATION: 96 % | DIASTOLIC BLOOD PRESSURE: 70 MMHG | TEMPERATURE: 98.5 F | WEIGHT: 202 LBS | SYSTOLIC BLOOD PRESSURE: 138 MMHG | HEART RATE: 87 BPM

## 2024-08-29 DIAGNOSIS — J22 ACUTE LOWER RESPIRATORY INFECTION: ICD-10-CM

## 2024-08-29 DIAGNOSIS — J22 ACUTE LOWER RESPIRATORY INFECTION: Primary | ICD-10-CM

## 2024-08-29 PROCEDURE — 3017F COLORECTAL CA SCREEN DOC REV: CPT

## 2024-08-29 PROCEDURE — 1036F TOBACCO NON-USER: CPT

## 2024-08-29 PROCEDURE — 1123F ACP DISCUSS/DSCN MKR DOCD: CPT

## 2024-08-29 PROCEDURE — 3075F SYST BP GE 130 - 139MM HG: CPT

## 2024-08-29 PROCEDURE — 71046 X-RAY EXAM CHEST 2 VIEWS: CPT

## 2024-08-29 PROCEDURE — G8427 DOCREV CUR MEDS BY ELIG CLIN: HCPCS

## 2024-08-29 PROCEDURE — 3078F DIAST BP <80 MM HG: CPT

## 2024-08-29 PROCEDURE — G8419 CALC BMI OUT NRM PARAM NOF/U: HCPCS

## 2024-08-29 PROCEDURE — 99213 OFFICE O/P EST LOW 20 MIN: CPT

## 2024-08-29 RX ORDER — METHYLPREDNISOLONE 4 MG
TABLET, DOSE PACK ORAL
Qty: 1 KIT | Refills: 0 | Status: SHIPPED | OUTPATIENT
Start: 2024-08-29

## 2024-08-29 RX ORDER — ALBUTEROL SULFATE 90 UG/1
2 AEROSOL, METERED RESPIRATORY (INHALATION) EVERY 6 HOURS PRN
Qty: 18 G | Refills: 0 | Status: SHIPPED | OUTPATIENT
Start: 2024-08-29

## 2024-08-29 RX ORDER — AZITHROMYCIN 250 MG/1
TABLET, FILM COATED ORAL
Qty: 6 TABLET | Refills: 0 | Status: SHIPPED | OUTPATIENT
Start: 2024-08-29 | End: 2024-09-08

## 2024-08-29 RX ORDER — BENZONATATE 200 MG/1
200 CAPSULE ORAL 3 TIMES DAILY PRN
Qty: 30 CAPSULE | Refills: 0 | Status: SHIPPED | OUTPATIENT
Start: 2024-08-29 | End: 2024-09-05

## 2024-08-29 SDOH — ECONOMIC STABILITY: INCOME INSECURITY: HOW HARD IS IT FOR YOU TO PAY FOR THE VERY BASICS LIKE FOOD, HOUSING, MEDICAL CARE, AND HEATING?: NOT VERY HARD

## 2024-08-29 SDOH — ECONOMIC STABILITY: FOOD INSECURITY: WITHIN THE PAST 12 MONTHS, YOU WORRIED THAT YOUR FOOD WOULD RUN OUT BEFORE YOU GOT MONEY TO BUY MORE.: NEVER TRUE

## 2024-08-29 SDOH — ECONOMIC STABILITY: FOOD INSECURITY: WITHIN THE PAST 12 MONTHS, THE FOOD YOU BOUGHT JUST DIDN'T LAST AND YOU DIDN'T HAVE MONEY TO GET MORE.: NEVER TRUE

## 2024-08-29 ASSESSMENT — ENCOUNTER SYMPTOMS
RHINORRHEA: 0
SORE THROAT: 1
ABDOMINAL PAIN: 0
SINUS PAIN: 0
WHEEZING: 1
TROUBLE SWALLOWING: 0
CHEST TIGHTNESS: 1
VOICE CHANGE: 0
VOMITING: 0
SHORTNESS OF BREATH: 0
COUGH: 1
EYE REDNESS: 0
NAUSEA: 0
SINUS PRESSURE: 0

## 2024-08-29 ASSESSMENT — PATIENT HEALTH QUESTIONNAIRE - PHQ9
SUM OF ALL RESPONSES TO PHQ9 QUESTIONS 1 & 2: 0
SUM OF ALL RESPONSES TO PHQ QUESTIONS 1-9: 0
1. LITTLE INTEREST OR PLEASURE IN DOING THINGS: NOT AT ALL
2. FEELING DOWN, DEPRESSED OR HOPELESS: NOT AT ALL
SUM OF ALL RESPONSES TO PHQ QUESTIONS 1-9: 0

## 2024-08-29 NOTE — PROGRESS NOTES
[facility]  2024    Keny Huang (:  1955) is a 68 y.o. male, here for evaluation of the following medical concerns:    Chief Complaint   Patient presents with    Cough    Pharyngitis    Wheezing        ASSESSMENT/ PLAN  1. Acute lower respiratory infection  - XR CHEST (2 VW); Future  - azithromycin (ZITHROMAX) 250 MG tablet; 500mg on day 1 followed by 250mg on days 2 - 5  Dispense: 6 tablet; Refill: 0  - benzonatate (TESSALON) 200 MG capsule; Take 1 capsule by mouth 3 times daily as needed for Cough  Dispense: 30 capsule; Refill: 0  - methylPREDNISolone (MEDROL DOSEPACK) 4 MG tablet; Take by mouth.  Dispense: 1 kit; Refill: 0  - albuterol sulfate HFA (PROVENTIL;VENTOLIN;PROAIR) 108 (90 Base) MCG/ACT inhaler; Inhale 2 puffs into the lungs every 6 hours as needed for Wheezing  Dispense: 18 g; Refill: 0       - Supportive care measures discussed. Also discussed red flag symptoms and when to escalate care.    Return if symptoms worsen or fail to improve.    HPI  Here today with URI.  Started about 1 week ago. Getting progressively worse. Has been spending time outside working.  +cough; not really productive. Affecting his sleep.   +chest feels tight  +sore throat  +fatigue and feeling slightly run down.  No sinus congestion.  Denies fever/chills. No bodyaches. Afebrile here.  Denies N/V/D. Appetite is OK.    Denies any known sick contacts.    No history of asthma, COPD. Non-smoker.    Seeing a Nephrologist next week for hyperkalemia.  Off of the losartan.     Hx heart attack in 2015.      ROS  Review of Systems   Constitutional:  Positive for fatigue. Negative for chills and fever.   HENT:  Positive for sore throat. Negative for congestion, ear discharge, ear pain, rhinorrhea, sinus pressure, sinus pain, trouble swallowing and voice change.    Eyes:  Negative for redness.   Respiratory:  Positive for cough, chest tightness and wheezing. Negative for shortness of breath.     Cardiovascular:  Negative for chest pain, palpitations and leg swelling.   Gastrointestinal:  Negative for abdominal pain, nausea and vomiting.   Genitourinary:  Negative for frequency.   Musculoskeletal:  Negative for myalgias.   Skin: Negative.    Neurological:  Negative for dizziness, weakness and headaches.   Hematological:  Negative for adenopathy.   Psychiatric/Behavioral: Negative.         HISTORIES  Current Outpatient Medications on File Prior to Visit   Medication Sig Dispense Refill    nitroGLYCERIN (NITROSTAT) 0.4 MG SL tablet Place 1 tablet under the tongue as needed for Chest pain      ezetimibe (ZETIA) 10 MG tablet Take 1 tablet by mouth daily      aspirin 81 MG tablet Take 1 tablet by mouth daily      atorvastatin (LIPITOR) 80 MG tablet Take 1 tablet by mouth daily      Omeprazole Magnesium (PRILOSEC OTC PO) Take  by mouth daily.        losartan (COZAAR) 100 MG tablet Take 1 tablet by mouth daily (Patient not taking: Reported on 8/29/2024)      fluticasone (FLONASE) 50 MCG/ACT nasal spray 1 spray by Each Nostril route daily (Patient not taking: Reported on 8/29/2024) 32 g 1     No current facility-administered medications on file prior to visit.      Past Medical History:   Diagnosis Date    Acute myocardial infarction of anterior wall (HCC) 6/11/2022    Heart attack ([facility]) 2015     Patient Active Problem List   Diagnosis    History of acute myocardial infarction    Arteriosclerosis of coronary artery    Closed left hip fracture (HCC)    Gastroesophageal reflux disease    Hyperlipidemia    Macular edema of right eye    Presence of drug coated stent in anterior descending branch of left coronary artery    Pure hypercholesterolemia    Stented coronary artery    Traumatic rupture of scapholunate ligament    Wrist sprain, left, initial encounter    Chronic cholecystitis    Primary hypertension       PE  Vitals:    08/29/24 1004   BP: 138/70   Pulse: 87   Temp: 98.5 °F (36.9 °C)   TempSrc: Temporal

## 2024-08-29 NOTE — PATIENT INSTRUCTIONS
Drink lots of water!     Inhaler as needed every 4-6 hours for chest tightness/wheezing     Chest xray today at your convenience

## 2024-08-30 DIAGNOSIS — J22 ACUTE LOWER RESPIRATORY INFECTION: Primary | ICD-10-CM

## 2024-08-30 RX ORDER — CEFUROXIME AXETIL 500 MG/1
500 TABLET ORAL 2 TIMES DAILY
Qty: 14 TABLET | Refills: 0 | Status: SHIPPED | OUTPATIENT
Start: 2024-08-30 | End: 2024-09-06

## 2024-09-16 ENCOUNTER — HOSPITAL ENCOUNTER (OUTPATIENT)
Dept: ULTRASOUND IMAGING | Age: 69
Discharge: HOME | End: 2024-09-16
Attending: INTERNAL MEDICINE
Payer: MEDICARE

## 2024-09-16 ENCOUNTER — HOSPITAL ENCOUNTER (OUTPATIENT)
Age: 69
Discharge: HOME | End: 2024-09-16
Attending: INTERNAL MEDICINE
Payer: MEDICARE

## 2024-09-16 DIAGNOSIS — I10 ESSENTIAL (PRIMARY) HYPERTENSION: ICD-10-CM

## 2024-09-16 LAB
ALBUMIN SERPL-MCNC: 4 G/DL (ref 3.4–5)
ANION GAP SERPL CALCULATED.3IONS-SCNC: 9 MMOL/L (ref 3–16)
BILIRUB UR QL STRIP.AUTO: NEGATIVE
BUN SERPL-MCNC: 14 MG/DL (ref 7–20)
CALCIUM SERPL-MCNC: 9.4 MG/DL (ref 8.3–10.6)
CHLORIDE SERPL-SCNC: 103 MMOL/L (ref 99–110)
CK SERPL-CCNC: 121 U/L (ref 39–308)
CLARITY UR: CLEAR
CO2 SERPL-SCNC: 25 MMOL/L (ref 21–32)
COLOR UR: NORMAL
CREAT SERPL-MCNC: 0.9 MG/DL (ref 0.8–1.3)
CREAT UR-MCNC: 21.2 MG/DL (ref 39–259)
GFR SERPLBLD CREATININE-BSD FMLA CKD-EPI: >90 ML/MIN/{1.73_M2}
GLUCOSE SERPL-MCNC: 95 MG/DL (ref 70–99)
GLUCOSE UR STRIP.AUTO-MCNC: NEGATIVE MG/DL
HGB UR QL STRIP.AUTO: NEGATIVE
KETONES UR STRIP.AUTO-MCNC: NEGATIVE MG/DL
LEUKOCYTE ESTERASE UR QL STRIP.AUTO: NEGATIVE
NITRITE UR QL STRIP.AUTO: NEGATIVE
OSMOLALITY SERPL: 286 MOSM/KG (ref 280–301)
OSMOLALITY UR: 92 MOSM/KG (ref 390–1070)
PH UR STRIP.AUTO: 6 [PH] (ref 5–8)
PHOSPHATE SERPL-MCNC: 2.8 MG/DL (ref 2.5–4.9)
POTASSIUM SERPL-SCNC: 4.6 MMOL/L (ref 3.5–5.1)
POTASSIUM UR-SCNC: 8.7 MMOL/L
PROT UR STRIP.AUTO-MCNC: NEGATIVE MG/DL
PROT UR-MCNC: 6 MG/DL
PROT/CREAT UR-RTO: 0.3 MG/DL
SODIUM SERPL-SCNC: 137 MMOL/L (ref 136–145)
SP GR UR STRIP.AUTO: <=1.005 (ref 1–1.03)
UA DIPSTICK W REFLEX MICRO PNL UR: NORMAL
URN SPEC COLLECT METH UR: NORMAL
UROBILINOGEN UR STRIP-ACNC: 0.2 E.U./DL

## 2024-09-16 PROCEDURE — 81003 URINALYSIS AUTO W/O SCOPE: CPT

## 2024-09-16 PROCEDURE — 82550 ASSAY OF CK (CPK): CPT

## 2024-09-16 PROCEDURE — 84156 ASSAY OF PROTEIN URINE: CPT

## 2024-09-16 PROCEDURE — 82088 ASSAY OF ALDOSTERONE: CPT

## 2024-09-16 PROCEDURE — 83930 ASSAY OF BLOOD OSMOLALITY: CPT

## 2024-09-16 PROCEDURE — 80069 RENAL FUNCTION PANEL: CPT

## 2024-09-16 PROCEDURE — 76770 US EXAM ABDO BACK WALL COMP: CPT

## 2024-09-16 PROCEDURE — 84133 ASSAY OF URINE POTASSIUM: CPT

## 2024-09-16 PROCEDURE — 36415 COLL VENOUS BLD VENIPUNCTURE: CPT

## 2024-09-16 PROCEDURE — 84244 ASSAY OF RENIN: CPT

## 2024-09-16 PROCEDURE — 82570 ASSAY OF URINE CREATININE: CPT

## 2024-09-16 PROCEDURE — 83935 ASSAY OF URINE OSMOLALITY: CPT

## 2024-09-17 LAB — ALDOST SERPL-MCNC: 16.5 NG/DL

## 2024-09-19 LAB — RENIN PLAS-CCNC: 3.4 NG/ML/HR

## 2024-12-02 ENCOUNTER — HOSPITAL ENCOUNTER (OUTPATIENT)
Age: 69
Discharge: HOME | End: 2024-12-02
Payer: MEDICARE

## 2024-12-02 LAB
ALBUMIN SERPL-MCNC: 4.3 G/DL (ref 3.4–5)
ANION GAP SERPL CALCULATED.3IONS-SCNC: 10 MMOL/L (ref 3–16)
BUN SERPL-MCNC: 12 MG/DL (ref 7–20)
CALCIUM SERPL-MCNC: 9.9 MG/DL (ref 8.3–10.6)
CHLORIDE SERPL-SCNC: 103 MMOL/L (ref 99–110)
CO2 SERPL-SCNC: 26 MMOL/L (ref 21–32)
CREAT SERPL-MCNC: 0.9 MG/DL (ref 0.8–1.3)
GFR SERPLBLD CREATININE-BSD FMLA CKD-EPI: >90 ML/MIN/{1.73_M2}
GLUCOSE SERPL-MCNC: 90 MG/DL (ref 70–99)
PHOSPHATE SERPL-MCNC: 3.4 MG/DL (ref 2.5–4.9)
POTASSIUM SERPL-SCNC: 4.7 MMOL/L (ref 3.5–5.1)
SODIUM SERPL-SCNC: 139 MMOL/L (ref 136–145)

## 2024-12-02 PROCEDURE — 36415 COLL VENOUS BLD VENIPUNCTURE: CPT

## 2024-12-02 PROCEDURE — 80069 RENAL FUNCTION PANEL: CPT

## 2025-02-18 ENCOUNTER — OFFICE VISIT (OUTPATIENT)
Dept: PRIMARY CARE CLINIC | Age: 70
End: 2025-02-18
Payer: MEDICARE

## 2025-02-18 VITALS
WEIGHT: 210 LBS | SYSTOLIC BLOOD PRESSURE: 144 MMHG | OXYGEN SATURATION: 99 % | TEMPERATURE: 98.3 F | BODY MASS INDEX: 28.48 KG/M2 | HEART RATE: 100 BPM | DIASTOLIC BLOOD PRESSURE: 80 MMHG

## 2025-02-18 DIAGNOSIS — J10.1 INFLUENZA A: ICD-10-CM

## 2025-02-18 DIAGNOSIS — R05.9 COUGH, UNSPECIFIED TYPE: Primary | ICD-10-CM

## 2025-02-18 LAB
INFLUENZA A ANTIGEN, POC: POSITIVE
INFLUENZA B ANTIGEN, POC: NEGATIVE

## 2025-02-18 PROCEDURE — 3017F COLORECTAL CA SCREEN DOC REV: CPT

## 2025-02-18 PROCEDURE — 1123F ACP DISCUSS/DSCN MKR DOCD: CPT

## 2025-02-18 PROCEDURE — G8427 DOCREV CUR MEDS BY ELIG CLIN: HCPCS

## 2025-02-18 PROCEDURE — 1159F MED LIST DOCD IN RCRD: CPT

## 2025-02-18 PROCEDURE — 3079F DIAST BP 80-89 MM HG: CPT

## 2025-02-18 PROCEDURE — 3077F SYST BP >= 140 MM HG: CPT

## 2025-02-18 PROCEDURE — 87804 INFLUENZA ASSAY W/OPTIC: CPT

## 2025-02-18 PROCEDURE — 99213 OFFICE O/P EST LOW 20 MIN: CPT

## 2025-02-18 PROCEDURE — G8419 CALC BMI OUT NRM PARAM NOF/U: HCPCS

## 2025-02-18 PROCEDURE — 1036F TOBACCO NON-USER: CPT

## 2025-02-18 RX ORDER — OSELTAMIVIR PHOSPHATE 75 MG/1
75 CAPSULE ORAL 2 TIMES DAILY
Qty: 10 CAPSULE | Refills: 0 | Status: SHIPPED | OUTPATIENT
Start: 2025-02-18 | End: 2025-02-23

## 2025-02-18 RX ORDER — BENZONATATE 200 MG/1
200 CAPSULE ORAL 3 TIMES DAILY PRN
Qty: 30 CAPSULE | Refills: 0 | Status: SHIPPED | OUTPATIENT
Start: 2025-02-18 | End: 2025-02-28

## 2025-02-18 SDOH — ECONOMIC STABILITY: FOOD INSECURITY: WITHIN THE PAST 12 MONTHS, YOU WORRIED THAT YOUR FOOD WOULD RUN OUT BEFORE YOU GOT MONEY TO BUY MORE.: NEVER TRUE

## 2025-02-18 SDOH — ECONOMIC STABILITY: FOOD INSECURITY: WITHIN THE PAST 12 MONTHS, THE FOOD YOU BOUGHT JUST DIDN'T LAST AND YOU DIDN'T HAVE MONEY TO GET MORE.: NEVER TRUE

## 2025-02-18 ASSESSMENT — ENCOUNTER SYMPTOMS
HEARTBURN: 0
HEMOPTYSIS: 0
CONSTIPATION: 0
ALLERGIC/IMMUNOLOGIC NEGATIVE: 1
CHANGE IN BOWEL HABIT: 0
ABDOMINAL DISTENTION: 0
TROUBLE SWALLOWING: 0
WHEEZING: 0
SINUS PRESSURE: 0
COUGH: 1
EYES NEGATIVE: 1
VOMITING: 0
STRIDOR: 0
DIARRHEA: 0
SHORTNESS OF BREATH: 0
NAUSEA: 0
SWOLLEN GLANDS: 0
RHINORRHEA: 0
ABDOMINAL PAIN: 0
VISUAL CHANGE: 0
CHEST TIGHTNESS: 0
SORE THROAT: 1

## 2025-02-18 ASSESSMENT — PATIENT HEALTH QUESTIONNAIRE - PHQ9
SUM OF ALL RESPONSES TO PHQ QUESTIONS 1-9: 0
2. FEELING DOWN, DEPRESSED OR HOPELESS: NOT AT ALL
SUM OF ALL RESPONSES TO PHQ9 QUESTIONS 1 & 2: 0
1. LITTLE INTEREST OR PLEASURE IN DOING THINGS: NOT AT ALL

## 2025-02-18 NOTE — PROGRESS NOTES
Diagnosis Date    Acute myocardial infarction of anterior wall ([facility]) 6/11/2022    Heart attack ([facility]) 2015     Patient Active Problem List   Diagnosis    History of acute myocardial infarction    Arteriosclerosis of coronary artery    Closed left hip fracture (HCC)    Gastroesophageal reflux disease    Hyperlipidemia    Macular edema of right eye    Presence of drug coated stent in anterior descending branch of left coronary artery    Pure hypercholesterolemia    Stented coronary artery    Traumatic rupture of scapholunate ligament    Wrist sprain, left, initial encounter    Chronic cholecystitis    Primary hypertension       PE  Vitals:    02/18/25 1439   BP: (!) 144/80   Pulse: 100   Temp: 98.3 °F (36.8 °C)   TempSrc: Oral   SpO2: 99%   Weight: 95.3 kg (210 lb)     Estimated body mass index is 28.48 kg/m² as calculated from the following:    Height as of 3/6/23: 1.829 m (6').    Weight as of this encounter: 95.3 kg (210 lb).    Physical Exam  Vitals reviewed.   Constitutional:       General: He is not in acute distress.     Appearance: Normal appearance. He is not ill-appearing.   HENT:      Head: Normocephalic and atraumatic.      Right Ear: Tympanic membrane normal. There is no impacted cerumen.      Left Ear: Tympanic membrane normal. There is no impacted cerumen.      Nose: Congestion present. No rhinorrhea.      Mouth/Throat:      Mouth: Mucous membranes are moist.      Pharynx: Oropharynx is clear. Posterior oropharyngeal erythema present. No oropharyngeal exudate.   Eyes:      General:         Right eye: No discharge.         Left eye: No discharge.      Extraocular Movements: Extraocular movements intact.      Conjunctiva/sclera: Conjunctivae normal.      Pupils: Pupils are equal, round, and reactive to light.   Cardiovascular:      Rate and Rhythm: Normal rate and regular rhythm.      Pulses: Normal pulses.      Heart sounds: Normal heart sounds. No murmur heard.  Pulmonary:      Effort: Pulmonary effort

## 2025-05-30 ENCOUNTER — OFFICE VISIT (OUTPATIENT)
Dept: FAMILY MEDICINE CLINIC | Age: 70
End: 2025-05-30
Payer: MEDICARE

## 2025-05-30 VITALS
HEIGHT: 70 IN | DIASTOLIC BLOOD PRESSURE: 76 MMHG | SYSTOLIC BLOOD PRESSURE: 120 MMHG | RESPIRATION RATE: 16 BRPM | TEMPERATURE: 97.5 F | OXYGEN SATURATION: 93 % | HEART RATE: 70 BPM | BODY MASS INDEX: 29.12 KG/M2 | WEIGHT: 203.4 LBS

## 2025-05-30 DIAGNOSIS — E78.2 MIXED HYPERLIPIDEMIA: ICD-10-CM

## 2025-05-30 DIAGNOSIS — I10 PRIMARY HYPERTENSION: ICD-10-CM

## 2025-05-30 DIAGNOSIS — Z95.5 STENTED CORONARY ARTERY: ICD-10-CM

## 2025-05-30 DIAGNOSIS — J22 ACUTE LOWER RESPIRATORY INFECTION: ICD-10-CM

## 2025-05-30 DIAGNOSIS — E87.5 HYPERKALEMIA: ICD-10-CM

## 2025-05-30 DIAGNOSIS — Z76.89 ENCOUNTER TO ESTABLISH CARE: Primary | ICD-10-CM

## 2025-05-30 PROBLEM — S63.399A: Status: RESOLVED | Noted: 2022-08-02 | Resolved: 2025-05-30

## 2025-05-30 PROBLEM — S63.502A WRIST SPRAIN, LEFT, INITIAL ENCOUNTER: Status: RESOLVED | Noted: 2022-05-18 | Resolved: 2025-05-30

## 2025-05-30 PROBLEM — S72.002A CLOSED LEFT HIP FRACTURE (HCC): Status: RESOLVED | Noted: 2022-03-31 | Resolved: 2025-05-30

## 2025-05-30 PROCEDURE — 1123F ACP DISCUSS/DSCN MKR DOCD: CPT

## 2025-05-30 PROCEDURE — 3078F DIAST BP <80 MM HG: CPT

## 2025-05-30 PROCEDURE — 1036F TOBACCO NON-USER: CPT

## 2025-05-30 PROCEDURE — 3074F SYST BP LT 130 MM HG: CPT

## 2025-05-30 PROCEDURE — 3017F COLORECTAL CA SCREEN DOC REV: CPT

## 2025-05-30 PROCEDURE — 99213 OFFICE O/P EST LOW 20 MIN: CPT

## 2025-05-30 PROCEDURE — G8419 CALC BMI OUT NRM PARAM NOF/U: HCPCS

## 2025-05-30 PROCEDURE — 1160F RVW MEDS BY RX/DR IN RCRD: CPT

## 2025-05-30 PROCEDURE — G8427 DOCREV CUR MEDS BY ELIG CLIN: HCPCS

## 2025-05-30 PROCEDURE — 1159F MED LIST DOCD IN RCRD: CPT

## 2025-05-30 RX ORDER — AZITHROMYCIN 250 MG/1
TABLET, FILM COATED ORAL
COMMUNITY
Start: 2025-05-26

## 2025-05-30 RX ORDER — METHYLPREDNISOLONE 4 MG/1
TABLET ORAL
Qty: 1 KIT | Refills: 0 | Status: SHIPPED | OUTPATIENT
Start: 2025-05-30

## 2025-05-30 RX ORDER — BENZONATATE 200 MG/1
200 CAPSULE ORAL 3 TIMES DAILY PRN
Qty: 30 CAPSULE | Refills: 0 | Status: SHIPPED | OUTPATIENT
Start: 2025-05-30 | End: 2025-06-09

## 2025-05-30 RX ORDER — FLUTICASONE PROPIONATE 50 MCG
SPRAY, SUSPENSION (ML) NASAL
COMMUNITY
Start: 2025-05-26

## 2025-05-30 RX ORDER — ALBUTEROL SULFATE 90 UG/1
2 INHALANT RESPIRATORY (INHALATION) EVERY 6 HOURS PRN
Qty: 18 G | Refills: 3 | Status: SHIPPED | OUTPATIENT
Start: 2025-05-30

## 2025-05-30 RX ORDER — CETIRIZINE HYDROCHLORIDE 10 MG/1
10 TABLET, CHEWABLE ORAL
COMMUNITY
Start: 2025-05-26 | End: 2025-06-25

## 2025-05-30 SDOH — HEALTH STABILITY: PHYSICAL HEALTH: ON AVERAGE, HOW MANY MINUTES DO YOU ENGAGE IN EXERCISE AT THIS LEVEL?: 30 MIN

## 2025-05-30 SDOH — HEALTH STABILITY: PHYSICAL HEALTH: ON AVERAGE, HOW MANY DAYS PER WEEK DO YOU ENGAGE IN MODERATE TO STRENUOUS EXERCISE (LIKE A BRISK WALK)?: 6 DAYS

## 2025-05-30 ASSESSMENT — ENCOUNTER SYMPTOMS
WHEEZING: 0
SHORTNESS OF BREATH: 0
EYE REDNESS: 0
SORE THROAT: 1
FACIAL SWELLING: 0
COUGH: 1
RHINORRHEA: 0
CHEST TIGHTNESS: 1
SINUS PRESSURE: 1
SINUS PAIN: 0

## 2025-05-30 ASSESSMENT — PATIENT HEALTH QUESTIONNAIRE - PHQ9
SUM OF ALL RESPONSES TO PHQ QUESTIONS 1-9: 0
2. FEELING DOWN, DEPRESSED OR HOPELESS: NOT AT ALL
SUM OF ALL RESPONSES TO PHQ QUESTIONS 1-9: 0
SUM OF ALL RESPONSES TO PHQ QUESTIONS 1-9: 0
1. LITTLE INTEREST OR PLEASURE IN DOING THINGS: NOT AT ALL
SUM OF ALL RESPONSES TO PHQ QUESTIONS 1-9: 0

## 2025-05-30 NOTE — PROGRESS NOTES
[facility]  2025    Keny Huang (:  1955) is a 69 y.o. male, here to establish care.    Chief Complaint   Patient presents with    Cough        Went to [facility] [phone]    New Patient    Pharyngitis     Scratchy     Chest Congestion    Nasal Congestion    Fatigue    Generalized Body Aches        ASSESSMENT/ PLAN  1. Encounter to establish care    2. Acute lower respiratory infection  - amoxicillin-clavulanate (AUGMENTIN) 875-125 MG per tablet; Take 1 tablet by mouth 2 times daily for 10 days  Dispense: 20 tablet; Refill: 0  - methylPREDNISolone (MEDROL DOSEPACK) 4 MG tablet; Take by mouth.  Dispense: 1 kit; Refill: 0  - benzonatate (TESSALON) 200 MG capsule; Take 1 capsule by mouth 3 times daily as needed for Cough  Dispense: 30 capsule; Refill: 0  - albuterol sulfate HFA (PROVENTIL;VENTOLIN;PROAIR) 108 (90 Base) MCG/ACT inhaler; Inhale 2 puffs into the lungs every 6 hours as needed for Wheezing  Dispense: 18 g; Refill: 3    3. Stented coronary artery  - On 81mg ASA; follows with Cardiology.    4. Primary hypertension  - Stable.    5. Mixed hyperlipidemia  - On statin & Zetia.    6. Hyperkalemia  - Following with Nephrology; monitoring.    - Supportive care measures discussed.      Return in about 6 months (around 2025) for Medicare Wellness Visit.    PURNIMA Bustillo is here today to establish care with me. Was seeing Dr. Kern at [facility]. Had his MWV with labs in .  His wife, Ara retired from [employer]  He is also retired. After he broke his hip. Lumbar yard.  Staying busy around the house.  They have adult children and grand-children.    Stays active.  Walks 2 miles a day, at least.    Sees Dr. Davila with Cardiology at [facility].  On atorvastatin & Zetia.   Hx heart attack in .  81mg ASA.    No history of asthma, COPD. Non-smoker.     Following with Nephrology- Dr. Lemon for hyperkalemia & HTN.  123/74 BP at home. Elevated here today,

## (undated) DEVICE — APPLICATOR MEDICATED 26 CC SOLUTION HI LT ORNG CHLORAPREP

## (undated) DEVICE — GAUZE SPONGES,8 PLY: Brand: CURITY

## (undated) DEVICE — DRAPE,ABDOMINAL,MAJOR,STERILE: Brand: MEDLINE

## (undated) DEVICE — TUBING, SUCTION, 3/16" X 10', STRAIGHT: Brand: MEDLINE

## (undated) DEVICE — TROCAR: Brand: KII FIOS FIRST ENTRY

## (undated) DEVICE — 3M™ TEGADERM™ TRANSPARENT FILM DRESSING FRAME STYLE, 1624W, 2-3/8 IN X 2-3/4 IN (6 CM X 7 CM), 100/CT 4CT/CASE: Brand: 3M™ TEGADERM™

## (undated) DEVICE — INSUFFLATION NEEDLE TO ESTABLISH PNEUMOPERITONEUM.: Brand: INSUFFLATION NEEDLE

## (undated) DEVICE — 3M™ STERI-STRIP™ COMPOUND BENZOIN TINCTURE 40 BAGS/CARTON 4 CARTONS/CASE C1544: Brand: 3M™ STERI-STRIP™

## (undated) DEVICE — YANKAUER,BULB TIP,W/O VENT,RIGID,STERILE: Brand: MEDLINE

## (undated) DEVICE — SUTURE SZ 0 27IN 5/8 CIR UR-6  TAPER PT VIOLET ABSRB VICRYL J603H

## (undated) DEVICE — DISPOSABLE LAPAROSCOPIC CLIP APPLIER WITH 20 CLIPS.: Brand: EPIX® UNIVERSAL CLIP APPLIER

## (undated) DEVICE — TISSUE RETRIEVAL SYSTEM: Brand: INZII RETRIEVAL SYSTEM

## (undated) DEVICE — TROCAR: Brand: KII® SLEEVE

## (undated) DEVICE — CIRCUIT ANES L72IN 3L BACT AND VIR FLTR EL CONN SGL LIMB

## (undated) DEVICE — GOWN,AURORA,NONREINF,RAGLAN,XXL,STERILE: Brand: MEDLINE

## (undated) DEVICE — INTENDED FOR TISSUE SEPARATION, AND OTHER PROCEDURES THAT REQUIRE A SHARP SURGICAL BLADE TO PUNCTURE OR CUT.: Brand: BARD-PARKER ® STAINLESS STEEL BLADES

## (undated) DEVICE — STANDARD HYPODERMIC NEEDLE,POLYPROPYLENE HUB: Brand: MONOJECT

## (undated) DEVICE — SOLUTION IV IRRIG 500ML 0.9% SODIUM CHL 2F7123

## (undated) DEVICE — PUMP SUC IRR TBNG L10FT W/ HNDPC ASSEMB STRYKEFLOW 2

## (undated) DEVICE — ELECTRODE PT RET AD L9FT HI MOIST COND ADH HYDRGEL CORDED

## (undated) DEVICE — CORD ES L10FT MPLR LAP

## (undated) DEVICE — SUTURE VCRL SZ 4-0 L18IN ABSRB UD L19MM PS-2 3/8 CIR PRIM J496H

## (undated) DEVICE — GLOVE ORANGE PI 8 1/2   MSG9085

## (undated) DEVICE — KIT,ANTI FOG,W/SPONGE & FLUID,SOFT PACK: Brand: MEDLINE

## (undated) DEVICE — SYRINGE MED 10ML LUERLOCK TIP W/O SFTY DISP

## (undated) DEVICE — MAJOR SET UP PK

## (undated) DEVICE — TUBING INSUF ISO CONN DISP